# Patient Record
Sex: MALE | Race: WHITE | NOT HISPANIC OR LATINO | Employment: FULL TIME | URBAN - METROPOLITAN AREA
[De-identification: names, ages, dates, MRNs, and addresses within clinical notes are randomized per-mention and may not be internally consistent; named-entity substitution may affect disease eponyms.]

---

## 2020-09-04 NOTE — PROGRESS NOTES
RM:________    Referral Provider: No ref. provider found Mare Romano MD    NEW PATIENT/ CONSULT  PREVIOUS CARDIOLOGIST:   CARDIAC TESTING:     : 1980   AGE: 40 y.o.    2020  REASON FOR VISIT/  CC: HEART FLUTTERS       WT: ____________ BP: __________L __________R/ HR_______________    CHEST PAIN: _____________    SOA: ____________PALPS: __________      LIGHTHEADED: ___________ FATIGUE: _______________ EDEMA______________  ALLERGIES:  Codeine; Erythromycin; and Penicillins  SMOKING HISTORY  Social History     Tobacco Use   • Smoking status: Never Smoker   • Smokeless tobacco: Never Used   Substance Use Topics   • Alcohol use: No   • Drug use: Defer          CHILDREN: _______________________       CAFFEINE USE________  ALCOHOL _____________  OCCUPATION_____________  Past Surgical History:   Procedure Laterality Date   • INNER EAR SURGERY     • TONSILLECTOMY                  FAMILY HISTORY  HEART DISEASE  CHF  DIABETES  CARDIAC ARREST  STROKE  CANCER  ANEURYSM                                                             H/O: MI_____   STROKE________   GOUT_____   ANEMIA______     CAROTID________ HIV____ CAD_______ HYPERCHOL _____    H/O: CHF _____   RF____ DM___ HTN_______PVD____THYROID DISEASE_______    PMH: GI ____   HEPATITIS ___ KIDNEY DISEASE ___ LUNG DISEASE _______     SLEEP APNEA ____ BLOOD CLOTS ____ DVT ____ VEIN STRIPPING ___________     CANCER _________________________________ CHEMO/ RADIATION__________

## 2020-09-09 ENCOUNTER — OFFICE VISIT (OUTPATIENT)
Dept: CARDIOLOGY | Facility: CLINIC | Age: 40
End: 2020-09-09

## 2020-09-09 VITALS
DIASTOLIC BLOOD PRESSURE: 86 MMHG | WEIGHT: 312 LBS | HEIGHT: 72 IN | BODY MASS INDEX: 42.26 KG/M2 | SYSTOLIC BLOOD PRESSURE: 134 MMHG | HEART RATE: 66 BPM

## 2020-09-09 DIAGNOSIS — R00.2 PALPITATIONS: Primary | ICD-10-CM

## 2020-09-09 DIAGNOSIS — Z91.89 FRAMINGHAM CARDIAC RISK <10% IN NEXT 10 YEARS: ICD-10-CM

## 2020-09-09 PROCEDURE — 93000 ELECTROCARDIOGRAM COMPLETE: CPT | Performed by: INTERNAL MEDICINE

## 2020-09-09 PROCEDURE — 99204 OFFICE O/P NEW MOD 45 MIN: CPT | Performed by: INTERNAL MEDICINE

## 2020-09-09 NOTE — PROGRESS NOTES
"Date of Office Visit: 20  Encounter Provider: Julio Tariq MD  Place of Service: [facility] CARDIOLOGY  Patient Name: Elvin Alexis  :1980    Chief Complaint   Patient presents with   • Palpitations   :     HPI:     Mr. Alexis is 40 y.o. and presents today ***      Past Medical History:   Diagnosis Date   • Dyslipidemia    • Fatty liver    • GERD (gastroesophageal reflux disease)    • Morbidly obese (CMS/HCC)        Past Surgical History:   Procedure Laterality Date   • INNER EAR SURGERY     • TONSILLECTOMY         Social History     Socioeconomic History   • Marital status:      Spouse name: Not on file   • Number of children: 1   • Years of education: Not on file   • Highest education level: Not on file   Tobacco Use   • Smoking status: Never Smoker   • Smokeless tobacco: Never Used   Substance and Sexual Activity   • Alcohol use: No   • Drug use: Defer   • Sexual activity: Defer   Social History Narrative    3 children by marriage.        Family History   Problem Relation Age of Onset   • Breast cancer Mother    • Hypertension Father    • Diabetes Father    • Diabetes Other    • Arthritis Other    • Heart attack Other         paternal grandfather, 70s   • COPD Other        ROS    Allergies   Allergen Reactions   • Codeine GI Intolerance     Vomit only   • Erythromycin Unknown (See Comments)     CHILDHOOD REACTION   • Penicillins Rash         Current Outpatient Medications:   •  ibuprofen (ADVIL,MOTRIN) 800 MG tablet, Take 1 tablet by mouth Every 8 (Eight) Hours As Needed for Mild Pain ., Disp: 14 tablet, Rfl: 0      Objective:     Vitals:    20 1411   BP: 134/86   BP Location: Left arm   Pulse: 66   Weight: (!) 142 kg (312 lb)   Height: 182.9 cm (72\")     Body mass index is 42.31 kg/m².    Physical Exam    Procedures      Assessment:       Diagnosis Plan   1. Palpitations  ECG 12 Lead    Adult Transthoracic Echo Complete W/ Cont if Necessary Per Protocol "   2. [place] cardiac risk <10% in next 10 years            Plan:       ***    Sincerely,       Julio Tariq MD

## 2020-09-09 NOTE — PROGRESS NOTES
Date of Office Visit: 20  Encounter Provider: Lupe King MA  Place of Service: [facility] CARDIOLOGY  Patient Name: Elvin Alexis  :1980    No chief complaint on file.  :     HPI:     Mr. Alexis is 40 y.o. and presents today ***      Past Medical History:   Diagnosis Date   • GERD (gastroesophageal reflux disease)    • Hypertriglyceridemia    • Morbidly obese (CMS/HCC)        Past Surgical History:   Procedure Laterality Date   • INNER EAR SURGERY     • TONSILLECTOMY         Social History     Socioeconomic History   • Marital status:      Spouse name: Not on file   • Number of children: Not on file   • Years of education: Not on file   • Highest education level: Not on file   Tobacco Use   • Smoking status: Never Smoker   • Smokeless tobacco: Never Used   Substance and Sexual Activity   • Alcohol use: No   • Drug use: Defer   • Sexual activity: Defer       Family History   Problem Relation Age of Onset   • Breast cancer Mother    • Hypertension Father    • Diabetes Other    • Arthritis Other    • Heart attack Other    • COPD Other        ROS    Allergies   Allergen Reactions   • Codeine GI Intolerance     Vomit only   • Erythromycin Unknown (See Comments)     CHILDHOOD REACTION   • Penicillins Rash         Current Outpatient Medications:   •  ibuprofen (ADVIL,MOTRIN) 800 MG tablet, Take 1 tablet by mouth Every 8 (Eight) Hours As Needed for Mild Pain ., Disp: 14 tablet, Rfl: 0      Objective:     There were no vitals filed for this visit.  There is no height or weight on file to calculate BMI.    Physical Exam    Procedures      Assessment:      No diagnosis found.       Plan:       ***    Sincerely,       Lupe King MA

## 2020-09-09 NOTE — PROGRESS NOTES
Date of Office Visit: 20  Encounter Provider: Julio Tariq MD  Place of Service: [facility] CARDIOLOGY  Patient Name: Elvin Alexis  :1980    Chief Complaint   Patient presents with   • Palpitations   :     HPI:     Mr. Alexis is 40 y.o. and presents today to be evaluated for palpitations.    He is obese.  He has low HDL (30, with , ).  He does not have a diagnosis of hypertension or diabetes.  He has no family history of CAD in first degree relatives.  He snores occasionally but not regularly; he has not been checked for EVER.  He is a non-smoker.    Approximately three weeks ago, he had a few days of palpitations.  They occurred at rest and felt like a small flutter.  He might have one, or have a few over the course of a minute.  They did not cause lightheadedness or chest pain.  He did not have any sustained symptoms or tachycardia.  They resolved on their own after a few days and have not returned.    He is also worried about his ASCVD risk going forward.  He does not have any anginal symptoms.     Past Medical History:   Diagnosis Date   • Dyslipidemia    • Fatty liver    • GERD (gastroesophageal reflux disease)    • Morbidly obese (CMS/HCC)        Past Surgical History:   Procedure Laterality Date   • INNER EAR SURGERY     • TONSILLECTOMY         Social History     Socioeconomic History   • Marital status:      Spouse name: Not on file   • Number of children: 1   • Years of education: Not on file   • Highest education level: Not on file   Tobacco Use   • Smoking status: Never Smoker   • Smokeless tobacco: Never Used   Substance and Sexual Activity   • Alcohol use: No   • Drug use: Defer   • Sexual activity: Defer   Social History Narrative    3 children by marriage.        Family History   Problem Relation Age of Onset   • Breast cancer Mother    • Hypertension Father    • Diabetes Father    • Diabetes Other    • Arthritis Other    • Heart attack  "Other         paternal grandfather, 70s   • COPD Other        Review of Systems   HENT: Positive for nosebleeds.    Cardiovascular: Positive for irregular heartbeat.   All other systems reviewed and are negative.      Allergies   Allergen Reactions   • Codeine GI Intolerance     Vomit only   • Erythromycin Unknown (See Comments)     CHILDHOOD REACTION   • Penicillins Rash         Current Outpatient Medications:   •  ibuprofen (ADVIL,MOTRIN) 800 MG tablet, Take 1 tablet by mouth Every 8 (Eight) Hours As Needed for Mild Pain ., Disp: 14 tablet, Rfl: 0      Objective:     Vitals:    09/09/20 1411   BP: 134/86   BP Location: Left arm   Pulse: 66   Weight: (!) 142 kg (312 lb)   Height: 182.9 cm (72\")     Body mass index is 42.31 kg/m².    Physical Exam   Constitutional: He is oriented to person, place, and time.   Obese   HENT:   Head: Normocephalic.   Nose: Nose normal.   Eyes: Conjunctivae and EOM are normal.   Neck: Normal range of motion. No JVD present.   Cardiovascular: Normal rate, regular rhythm, normal heart sounds and intact distal pulses.   No murmur heard.  Pulmonary/Chest: Effort normal and breath sounds normal.   Abdominal: Soft. There is no tenderness.   Obesity limits abdominal exam   Musculoskeletal: Normal range of motion. He exhibits no edema.   Neurological: He is alert and oriented to person, place, and time. No cranial nerve deficit.   Skin: Skin is warm and dry. No rash noted.   Psychiatric: He has a normal mood and affect. His behavior is normal. Judgment and thought content normal.   Vitals reviewed.        ECG 12 Lead  Date/Time: 9/9/2020 2:20 PM  Performed by: Julio Tariq MD  Authorized by: Julio Tariq MD   Comparison: not compared with previous ECG   Previous ECG: no previous ECG available  Rhythm: sinus rhythm  Conduction: conduction normal  ST Segments: ST segments normal  T Waves: T waves normal  QRS axis: normal  Other: no other findings    Clinical impression: normal ECG            "   Assessment:       Diagnosis Plan   1. Palpitations  ECG 12 Lead    Adult Transthoracic Echo Complete W/ Cont if Necessary Per Protocol   2. [place] cardiac risk <10% in next 10 years            Plan:       His palpitations were surely benign premature ventricular ectopics.  I will get an echo to make sure he has a structurally normal heart, but I don't think he needs rhythm monitoring since they have resolved on their own.  He had labs earlier this year which included normal electrolytes and TSH.      Once the echo is back, we will discuss his ASCVD risk.  His risk is low over the next 10 years, 2%.  Despite it being so low, I do think a CACS would be reasonable, as he has very low HDL and I wonder if he would benefit from therapy for that.  In the meantime, weight loss and cardiovascular exercise are the most important things I would recommend.     Sincerely,       Julio Tariq MD

## 2020-10-20 ENCOUNTER — TELEPHONE (OUTPATIENT)
Dept: CARDIOLOGY | Facility: CLINIC | Age: 40
End: 2020-10-20

## 2020-10-20 ENCOUNTER — HOSPITAL ENCOUNTER (OUTPATIENT)
Dept: CARDIOLOGY | Facility: HOSPITAL | Age: 40
Discharge: HOME | End: 2020-10-20
Admitting: INTERNAL MEDICINE

## 2020-10-20 VITALS
SYSTOLIC BLOOD PRESSURE: 134 MMHG | HEART RATE: 72 BPM | BODY MASS INDEX: 42.26 KG/M2 | DIASTOLIC BLOOD PRESSURE: 86 MMHG | HEIGHT: 72 IN | WEIGHT: 312 LBS

## 2020-10-20 DIAGNOSIS — R00.2 PALPITATIONS: ICD-10-CM

## 2020-10-20 LAB
AORTIC ARCH: 2.6 CM
ASCENDING AORTA: 3.3 CM
BH CV ECHO MEAS - ACS: 2.6 CM
BH CV ECHO MEAS - AO MAX PG (FULL): 4.3 MMHG
BH CV ECHO MEAS - AO MAX PG: 8.5 MMHG
BH CV ECHO MEAS - AO MEAN PG (FULL): 2.6 MMHG
BH CV ECHO MEAS - AO MEAN PG: 4.5 MMHG
BH CV ECHO MEAS - AO V2 MAX: 146 CM/SEC
BH CV ECHO MEAS - AO V2 MEAN: 98.7 CM/SEC
BH CV ECHO MEAS - AO V2 VTI: 30.6 CM
BH CV ECHO MEAS - ASC AORTA: 3.3 CM
BH CV ECHO MEAS - AVA(I,A): 3.5 CM^2
BH CV ECHO MEAS - AVA(I,D): 3.5 CM^2
BH CV ECHO MEAS - AVA(V,A): 2.9 CM^2
BH CV ECHO MEAS - AVA(V,D): 2.9 CM^2
BH CV ECHO MEAS - BSA(HAYCOCK): 2.7 M^2
BH CV ECHO MEAS - BSA: 2.6 M^2
BH CV ECHO MEAS - BZI_BMI: 42.3 KILOGRAMS/M^2
BH CV ECHO MEAS - BZI_METRIC_HEIGHT: 182.9 CM
BH CV ECHO MEAS - BZI_METRIC_WEIGHT: 141.5 KG
BH CV ECHO MEAS - EDV(MOD-SP2): 78 ML
BH CV ECHO MEAS - EDV(MOD-SP4): 81 ML
BH CV ECHO MEAS - EDV(TEICH): 120.6 ML
BH CV ECHO MEAS - EF(CUBED): 74 %
BH CV ECHO MEAS - EF(MOD-BP): 56.5 %
BH CV ECHO MEAS - EF(MOD-SP2): 57.7 %
BH CV ECHO MEAS - EF(MOD-SP4): 56.8 %
BH CV ECHO MEAS - EF(TEICH): 65.5 %
BH CV ECHO MEAS - ESV(MOD-SP2): 33 ML
BH CV ECHO MEAS - ESV(MOD-SP4): 35 ML
BH CV ECHO MEAS - ESV(TEICH): 41.6 ML
BH CV ECHO MEAS - FS: 36.1 %
BH CV ECHO MEAS - IVS/LVPW: 1
BH CV ECHO MEAS - IVSD: 1.1 CM
BH CV ECHO MEAS - LAT PEAK E' VEL: 11.9 CM/SEC
BH CV ECHO MEAS - LV DIASTOLIC VOL/BSA (35-75): 31.5 ML/M^2
BH CV ECHO MEAS - LV MASS(C)D: 204.3 GRAMS
BH CV ECHO MEAS - LV MASS(C)DI: 79.4 GRAMS/M^2
BH CV ECHO MEAS - LV MAX PG: 4.2 MMHG
BH CV ECHO MEAS - LV MEAN PG: 1.9 MMHG
BH CV ECHO MEAS - LV SYSTOLIC VOL/BSA (12-30): 13.6 ML/M^2
BH CV ECHO MEAS - LV V1 MAX: 102.3 CM/SEC
BH CV ECHO MEAS - LV V1 MEAN: 63.1 CM/SEC
BH CV ECHO MEAS - LV V1 VTI: 25.3 CM
BH CV ECHO MEAS - LVIDD: 5 CM
BH CV ECHO MEAS - LVIDS: 3.2 CM
BH CV ECHO MEAS - LVLD AP2: 7.8 CM
BH CV ECHO MEAS - LVLD AP4: 7.3 CM
BH CV ECHO MEAS - LVLS AP2: 6.8 CM
BH CV ECHO MEAS - LVLS AP4: 6.3 CM
BH CV ECHO MEAS - LVOT AREA (M): 4.2 CM^2
BH CV ECHO MEAS - LVOT AREA: 4.2 CM^2
BH CV ECHO MEAS - LVOT DIAM: 2.3 CM
BH CV ECHO MEAS - LVPWD: 1.1 CM
BH CV ECHO MEAS - MED PEAK E' VEL: 10.1 CM/SEC
BH CV ECHO MEAS - MR MAX PG: 82.2 MMHG
BH CV ECHO MEAS - MR MAX VEL: 453.4 CM/SEC
BH CV ECHO MEAS - MV A DUR: 0.13 SEC
BH CV ECHO MEAS - MV A MAX VEL: 60.8 CM/SEC
BH CV ECHO MEAS - MV DEC SLOPE: 355.8 CM/SEC^2
BH CV ECHO MEAS - MV DEC TIME: 0.23 SEC
BH CV ECHO MEAS - MV E MAX VEL: 81 CM/SEC
BH CV ECHO MEAS - MV E/A: 1.3
BH CV ECHO MEAS - MV MAX PG: 5.5 MMHG
BH CV ECHO MEAS - MV MEAN PG: 2.2 MMHG
BH CV ECHO MEAS - MV P1/2T MAX VEL: 81.8 CM/SEC
BH CV ECHO MEAS - MV P1/2T: 67.4 MSEC
BH CV ECHO MEAS - MV V2 MAX: 116.7 CM/SEC
BH CV ECHO MEAS - MV V2 MEAN: 71.1 CM/SEC
BH CV ECHO MEAS - MV V2 VTI: 34.8 CM
BH CV ECHO MEAS - MVA P1/2T LCG: 2.7 CM^2
BH CV ECHO MEAS - MVA(P1/2T): 3.3 CM^2
BH CV ECHO MEAS - MVA(VTI): 3.1 CM^2
BH CV ECHO MEAS - PA MAX PG (FULL): 2.4 MMHG
BH CV ECHO MEAS - PA MAX PG: 4.5 MMHG
BH CV ECHO MEAS - PA V2 MAX: 105.9 CM/SEC
BH CV ECHO MEAS - PULM A REVS DUR: 0.12 SEC
BH CV ECHO MEAS - PULM A REVS VEL: 31.5 CM/SEC
BH CV ECHO MEAS - PULM DIAS VEL: 39.9 CM/SEC
BH CV ECHO MEAS - PULM S/D: 1.5
BH CV ECHO MEAS - PULM SYS VEL: 61.2 CM/SEC
BH CV ECHO MEAS - PVA(V,A): 3.7 CM^2
BH CV ECHO MEAS - PVA(V,D): 3.7 CM^2
BH CV ECHO MEAS - QP/QS: 0.95
BH CV ECHO MEAS - RAP SYSTOLE: 3 MMHG
BH CV ECHO MEAS - RV MAX PG: 2.1 MMHG
BH CV ECHO MEAS - RV MEAN PG: 1.2 MMHG
BH CV ECHO MEAS - RV V1 MAX: 72.4 CM/SEC
BH CV ECHO MEAS - RV V1 MEAN: 53.1 CM/SEC
BH CV ECHO MEAS - RV V1 VTI: 18.7 CM
BH CV ECHO MEAS - RVOT AREA: 5.4 CM^2
BH CV ECHO MEAS - RVOT DIAM: 2.6 CM
BH CV ECHO MEAS - RVSP: 22 MMHG
BH CV ECHO MEAS - SI(CUBED): 36.8 ML/M^2
BH CV ECHO MEAS - SI(LVOT): 41.4 ML/M^2
BH CV ECHO MEAS - SI(MOD-SP2): 17.5 ML/M^2
BH CV ECHO MEAS - SI(MOD-SP4): 17.9 ML/M^2
BH CV ECHO MEAS - SI(TEICH): 30.7 ML/M^2
BH CV ECHO MEAS - SUP REN AO DIAM: 2 CM
BH CV ECHO MEAS - SV(CUBED): 94.8 ML
BH CV ECHO MEAS - SV(LVOT): 106.4 ML
BH CV ECHO MEAS - SV(MOD-SP2): 45 ML
BH CV ECHO MEAS - SV(MOD-SP4): 46 ML
BH CV ECHO MEAS - SV(RVOT): 101 ML
BH CV ECHO MEAS - SV(TEICH): 79 ML
BH CV ECHO MEAS - TAPSE (>1.6): 2.6 CM
BH CV ECHO MEAS - TR MAX VEL: 224.4 CM/SEC
BH CV ECHO MEASUREMENTS AVERAGE E/E' RATIO: 7.36
BH CV XLRA - RV BASE: 3.6 CM
BH CV XLRA - RV LENGTH: 7.2 CM
BH CV XLRA - RV MID: 2.9 CM
BH CV XLRA - TDI S': 11 CM/SEC
LEFT ATRIUM VOLUME INDEX: 28 ML/M2
MAXIMAL PREDICTED HEART RATE: 180 BPM
SINUS: 3.5 CM
STJ: 2.9 CM
STRESS TARGET HR: 153 BPM

## 2020-10-20 PROCEDURE — 93306 TTE W/DOPPLER COMPLETE: CPT | Performed by: INTERNAL MEDICINE

## 2020-10-20 PROCEDURE — 93306 TTE W/DOPPLER COMPLETE: CPT

## 2020-10-20 NOTE — TELEPHONE ENCOUNTER
----- Message from Julio Tariq MD sent at 10/20/2020  1:43 PM EDT -----  I left a VM, I feel this study is normal.  I don't think there's much LVH, and likely normal for his size.  I asked him to call me back about the CACS we discussed.

## 2023-01-09 ENCOUNTER — OFFICE VISIT (OUTPATIENT)
Dept: CARDIOLOGY | Facility: CLINIC | Age: 43
End: 2023-01-09
Payer: COMMERCIAL

## 2023-01-09 VITALS
DIASTOLIC BLOOD PRESSURE: 80 MMHG | BODY MASS INDEX: 42.66 KG/M2 | HEART RATE: 62 BPM | WEIGHT: 315 LBS | SYSTOLIC BLOOD PRESSURE: 130 MMHG | HEIGHT: 72 IN

## 2023-01-09 DIAGNOSIS — R06.83 SNORING: ICD-10-CM

## 2023-01-09 DIAGNOSIS — I49.8 FLUTTERING HEART: Primary | ICD-10-CM

## 2023-01-09 DIAGNOSIS — M62.838 MUSCLE SPASM: ICD-10-CM

## 2023-01-09 PROCEDURE — 93000 ELECTROCARDIOGRAM COMPLETE: CPT | Performed by: NURSE PRACTITIONER

## 2023-01-09 PROCEDURE — 99214 OFFICE O/P EST MOD 30 MIN: CPT | Performed by: NURSE PRACTITIONER

## 2023-01-09 NOTE — PROGRESS NOTES
Date of Office Visit: 2023  Encounter Provider: BRENT Carrillo  Place of Service: [facility] CARDIOLOGY  Patient Name: Elvin Alexis  :1980  Primary Cardiologist: Dr. Julio Tariq    Chief Complaint   Patient presents with   • Palpitations   :     HPI: Elvin Alexis is a 42 y.o. male who presents today for cardiac follow-up visit. He is a new patient to me and I reviewed his medical records.    He has been diagnosed with GERD, dyslipidemia, fatty liver, and obesity.  He denies a history of hypertension, diabetes, or cardiac disease.    In 2020, he saw Dr. Tariq for a few days of palpitations which he described a brief fluttering sensation and he was also worried about his ASCVD risk going forward.  She reviewed his previous blood work that year which showed normal electrolytes and TSH.  Echocardiogram showed normal LVEF, concentric left ventricular hypertrophy (Dr. Tariq said she did not appreciate much LVH and felt that it was normal for size), and mild tricuspid regurgitation.  She recommended a coronary artery calcium score.    He presents today for evaluation of a fluttering sensation and increased heart rate.  He has had intermittent palpitations over the past couple of years.  He describes a brief fluttering sensation in his epigastric area which has become more frequent over the past 2 weeks.  He notices that if he climbs steps or is carrying in groceries that he gets his fluttering sensation in the epigastric area that just last a few seconds and resolves with rest.  He describes it as a \"muscle spasm\".  He says he has been under an increased amount of stress.  He drinks 2 cups of caffeine (1 coffee and 1 Diet Coke) daily, denies use of nicotine/illicit drug/stimulants, and denies history of thyroid disease.  He snores at nighttime, but has never been tested for sleep apnea.  He is seeing his PCP on Thursday and will have blood work completed that  day      Past Medical History:   Diagnosis Date   • Dyslipidemia    • Fatty liver    • GERD (gastroesophageal reflux disease)    • Morbidly obese (HCC)        Past Surgical History:   Procedure Laterality Date   • INNER EAR SURGERY     • TONSILLECTOMY         Social History     Socioeconomic History   • Marital status:    • Number of children: 1   Tobacco Use   • Smoking status: Never   • Smokeless tobacco: Never   Substance and Sexual Activity   • Alcohol use: No   • Drug use: Defer   • Sexual activity: Defer       Family History   Problem Relation Age of Onset   • Breast cancer Mother    • Hypertension Father    • Diabetes Father    • Diabetes Other    • Arthritis Other    • Heart attack Other         paternal grandfather, 70s   • COPD Other        The following portion of the patient's history were reviewed and updated as appropriate: past medical history, past surgical history, past social history, past family history, allergies, current medications, and problem list.    Review of Systems   Constitutional: Negative.   Cardiovascular: Positive for palpitations.   Respiratory: Negative.    Hematologic/Lymphatic: Negative.    Musculoskeletal: Positive for muscle cramps.   Neurological: Negative.        Allergies   Allergen Reactions   • Codeine GI Intolerance     Vomit only   • Erythromycin Unknown (See Comments)     CHILDHOOD REACTION   • Phenylephrine Other (See Comments)     Chest pain   • Penicillins Rash         Current Outpatient Medications:   •  ibuprofen (ADVIL,MOTRIN) 800 MG tablet, Take 1 tablet by mouth Every 8 (Eight) Hours As Needed for Mild Pain . (Patient taking differently: Take 800 mg by mouth As Needed for Mild Pain.), Disp: 14 tablet, Rfl: 0         Objective:     Vitals:    01/09/23 1100   BP: 130/80   BP Location: Left arm   Patient Position: Sitting   Cuff Size: Adult   Pulse: 62   Weight: (!) 144 kg (317 lb 9.6 oz)   Height: 182.9 cm (72\")     Body mass index is 43.07  kg/m².    PHYSICAL EXAM:    Vitals Reviewed.   General Appearance: No acute distress, well developed and well nourished. Obese.   Eyes: Conjunctiva and lids: No erythema, swelling, or discharge. Sclera non-icteric.    HENT: Atraumatic, normocephalic. External eyes, ears, and nose normal. No hearing loss noted. Mucous membranes normal. Lips not cyanotic. Neck supple with no tenderness. Wearing mask.   Respiratory: No signs of respiratory distress. Respiration rhythm and depth normal.   Clear to auscultation. No rales, crackles, rhonchi, or wheezing auscultated.   Cardiovascular:  Jugular Venous Pressure: Normal  Heart Rate and Rhythm: Normal, Heart Sounds: Normal S1 and S2. No S3 or S4 noted.  Murmurs: No murmurs noted. No rubs, thrills, or gallops.   Lower Extremities: No edema noted.  Gastrointestinal:  Abdomen soft, non-distended, non-tender.    Musculoskeletal: Normal movement of extremities.  Skin and Nails: General appearance normal. No pallor, cyanosis, diaphoresis. Skin temperature normal. No clubbing of fingernails.   Psychiatric: Patient alert and oriented to person, place, and time. Speech and behavior appropriate. Normal mood and affect.       ECG 12 Lead    Date/Time: 1/9/2023 11:00 AM  Performed by: Erika Hess APRN  Authorized by: Erika Hess APRN   Comparison: compared with previous ECG from 9/9/2020  Similar to previous ECG  Rhythm: sinus rhythm  Rate: normal  BPM: 82  Conduction: conduction normal  ST Segments: ST segments normal  T Waves: T waves normal  QRS axis: normal  Other: no other findings    Clinical impression: normal ECG              Assessment:       Diagnosis Plan   1. Fluttering heart  Holter Monitor - 24 Hour    Ambulatory Referral to Sleep Medicine      2. Muscle spasm  Holter Monitor - 24 Hour      3. Snoring  Holter Monitor - 24 Hour    Ambulatory Referral to Sleep Medicine             Plan:       He reports a fluttering sensation over the past couple of years it is  worsened over the last 2 weeks.  He has noticed this when going up steps or carrying groceries.  He had it the other day and his apple watch captured normal sinus rhythm with a heart rate of 89 bpm.  He feels that it may be attributed to stress.      He also describes as fluttering sensation in the epigastric area not the upper chest.  He said sometimes it feels like a \"muscle spasm\".  I wonder if this is not actually an palpitation, but some type of esophageal spasm.  I asked him to discuss with his PCP on Thursday.    In the meantime, I recommended 24-hour Holter monitor.  He snores at nighttime and have referred him to [facility] sleep medicine.  He will have blood work completed at his PCP office on Thursday and hopefully that will include electrolytes and thyroid testing.  If he continues to have the symptoms and there is nothing on the Holter monitor, we may put him on the treadmill to see what his response to exercises with his blood pressure/heart rate/EKG.  At this time he does not want to proceed with a CT calcium score as previously recommended 2 years ago.    Further recommendations to follow after testing is completed.    ADDENDUM 1/16/2023:  · Blood work completed at PCP office on 1/12/23: CBC, CMP, TSH normal.     As always, it has been a pleasure to participate in your patient's care. Thank you.         Sincerely,         BRENT Zaragoza  [facility]      · Dictated utilizing Dragon Dictation  · COVID-19 Precautions - Patient was compliant in wearing a mask. When I saw the patient, I used appropriate personal protective equipment (PPE) including mask and eye shield (standard procedure).  Additionally, I used gown and gloves if indicated.  Hand hygiene was completed before and after seeing the patient.  · I spent 30 minutes reviewing her medical records/testing/previous office notes/labs, face-to-face interaction with patient, physical examination, formulating the plan  of care, and discussion of plan of care with patient.

## 2023-01-19 ENCOUNTER — TELEPHONE (OUTPATIENT)
Dept: CARDIOLOGY | Facility: CLINIC | Age: 43
End: 2023-01-19
Payer: COMMERCIAL

## 2023-01-19 PROBLEM — I49.3 PVCS (PREMATURE VENTRICULAR CONTRACTIONS): Status: ACTIVE | Noted: 2023-01-19

## 2023-01-19 NOTE — TELEPHONE ENCOUNTER
Please schedule 6-month follow-up visit with Dr. Tariq to follow-up on PVCs/palpitations.  Thank you.

## 2023-01-19 NOTE — TELEPHONE ENCOUNTER
Mr. Alexis seen 1/9 for fluttering sensation and increased heart rate.  Holter monitor shows average heart rate of 70.  Palpitations correlated with PVCs. Labs at PCP stable. He thinks stress is the big contributor & is working with his PCP. He does not want to start a beta blocker. He will monitor and call with concerns. He wants to follow up with Dr. Tariq in 6 months.

## 2023-02-17 RX ORDER — ATENOLOL 25 MG/1
25 TABLET ORAL DAILY
Qty: 30 TABLET | Refills: 0 | Status: SHIPPED | OUTPATIENT
Start: 2023-02-17 | End: 2023-02-21

## 2023-02-21 RX ORDER — ATENOLOL 25 MG/1
25 TABLET ORAL DAILY
Qty: 90 TABLET | Refills: 3 | Status: SHIPPED | OUTPATIENT
Start: 2023-02-21

## 2023-03-06 ENCOUNTER — APPOINTMENT (OUTPATIENT)
Dept: CT IMAGING | Facility: HOSPITAL | Age: 43
End: 2023-03-06
Payer: COMMERCIAL

## 2023-03-06 ENCOUNTER — HOSPITAL ENCOUNTER (OUTPATIENT)
Facility: HOSPITAL | Age: 43
Setting detail: OBSERVATION
Discharge: HOME | End: 2023-03-07
Attending: EMERGENCY MEDICINE | Admitting: EMERGENCY MEDICINE
Payer: COMMERCIAL

## 2023-03-06 DIAGNOSIS — M25.532 LEFT WRIST PAIN: ICD-10-CM

## 2023-03-06 DIAGNOSIS — R51.9 ACUTE NONINTRACTABLE HEADACHE, UNSPECIFIED HEADACHE TYPE: Primary | ICD-10-CM

## 2023-03-06 DIAGNOSIS — M79.642 PAIN IN LEFT HAND: ICD-10-CM

## 2023-03-06 DIAGNOSIS — M54.81 OCCIPITAL NEURALGIA, UNSPECIFIED LATERALITY: ICD-10-CM

## 2023-03-06 LAB
ALBUMIN SERPL-MCNC: 4.2 G/DL (ref 3.5–5.2)
ALBUMIN/GLOB SERPL: 1.6 G/DL
ALP SERPL-CCNC: 82 U/L (ref 39–117)
ALT SERPL W P-5'-P-CCNC: 19 U/L (ref 1–41)
ANION GAP SERPL CALCULATED.3IONS-SCNC: 10.9 MMOL/L (ref 5–15)
AST SERPL-CCNC: 13 U/L (ref 1–40)
BASOPHILS # BLD AUTO: 0.03 10*3/MM3 (ref 0–0.2)
BASOPHILS NFR BLD AUTO: 0.3 % (ref 0–1.5)
BILIRUB SERPL-MCNC: 0.6 MG/DL (ref 0–1.2)
BUN SERPL-MCNC: 12 MG/DL (ref 6–20)
BUN/CREAT SERPL: 15.4 (ref 7–25)
CALCIUM SPEC-SCNC: 8.9 MG/DL (ref 8.6–10.5)
CHLORIDE SERPL-SCNC: 106 MMOL/L (ref 98–107)
CO2 SERPL-SCNC: 24.1 MMOL/L (ref 22–29)
CREAT SERPL-MCNC: 0.78 MG/DL (ref 0.76–1.27)
DEPRECATED RDW RBC AUTO: 41.1 FL (ref 37–54)
EGFRCR SERPLBLD CKD-EPI 2021: 114.2 ML/MIN/1.73
EOSINOPHIL # BLD AUTO: 0.34 10*3/MM3 (ref 0–0.4)
EOSINOPHIL NFR BLD AUTO: 3.8 % (ref 0.3–6.2)
ERYTHROCYTE [DISTWIDTH] IN BLOOD BY AUTOMATED COUNT: 12.6 % (ref 12.3–15.4)
GLOBULIN UR ELPH-MCNC: 2.7 GM/DL
GLUCOSE SERPL-MCNC: 103 MG/DL (ref 65–99)
HCT VFR BLD AUTO: 39.8 % (ref 37.5–51)
HGB BLD-MCNC: 14.1 G/DL (ref 13–17.7)
IMM GRANULOCYTES # BLD AUTO: 0.02 10*3/MM3 (ref 0–0.05)
IMM GRANULOCYTES NFR BLD AUTO: 0.2 % (ref 0–0.5)
LYMPHOCYTES # BLD AUTO: 1.72 10*3/MM3 (ref 0.7–3.1)
LYMPHOCYTES NFR BLD AUTO: 19.3 % (ref 19.6–45.3)
MCH RBC QN AUTO: 31.1 PG (ref 26.6–33)
MCHC RBC AUTO-ENTMCNC: 35.4 G/DL (ref 31.5–35.7)
MCV RBC AUTO: 87.9 FL (ref 79–97)
MONOCYTES # BLD AUTO: 1.16 10*3/MM3 (ref 0.1–0.9)
MONOCYTES NFR BLD AUTO: 13 % (ref 5–12)
NEUTROPHILS NFR BLD AUTO: 5.63 10*3/MM3 (ref 1.7–7)
NEUTROPHILS NFR BLD AUTO: 63.4 % (ref 42.7–76)
NRBC BLD AUTO-RTO: 0 /100 WBC (ref 0–0.2)
PLATELET # BLD AUTO: 207 10*3/MM3 (ref 140–450)
PMV BLD AUTO: 11.1 FL (ref 6–12)
POTASSIUM SERPL-SCNC: 4 MMOL/L (ref 3.5–5.2)
PROT SERPL-MCNC: 6.9 G/DL (ref 6–8.5)
RBC # BLD AUTO: 4.53 10*6/MM3 (ref 4.14–5.8)
SODIUM SERPL-SCNC: 141 MMOL/L (ref 136–145)
WBC NRBC COR # BLD: 8.9 10*3/MM3 (ref 3.4–10.8)

## 2023-03-06 PROCEDURE — G0378 HOSPITAL OBSERVATION PER HR: HCPCS

## 2023-03-06 PROCEDURE — 36415 COLL VENOUS BLD VENIPUNCTURE: CPT

## 2023-03-06 PROCEDURE — 85025 COMPLETE CBC W/AUTO DIFF WBC: CPT | Performed by: PHYSICIAN ASSISTANT

## 2023-03-06 PROCEDURE — 96374 THER/PROPH/DIAG INJ IV PUSH: CPT

## 2023-03-06 PROCEDURE — 99284 EMERGENCY DEPT VISIT MOD MDM: CPT

## 2023-03-06 PROCEDURE — 25010000002 METHYLPREDNISOLONE PER 125 MG: Performed by: PHYSICIAN ASSISTANT

## 2023-03-06 PROCEDURE — 70450 CT HEAD/BRAIN W/O DYE: CPT

## 2023-03-06 PROCEDURE — 80053 COMPREHEN METABOLIC PANEL: CPT | Performed by: PHYSICIAN ASSISTANT

## 2023-03-06 RX ORDER — ACETAMINOPHEN 325 MG/1
650 TABLET ORAL EVERY 4 HOURS PRN
Status: DISCONTINUED | OUTPATIENT
Start: 2023-03-06 | End: 2023-03-07 | Stop reason: HOSPADM

## 2023-03-06 RX ORDER — OXCARBAZEPINE 300 MG/1
300 TABLET, FILM COATED ORAL 2 TIMES DAILY
Status: DISCONTINUED | OUTPATIENT
Start: 2023-03-06 | End: 2023-03-07 | Stop reason: HOSPADM

## 2023-03-06 RX ORDER — ONDANSETRON 2 MG/ML
4 INJECTION INTRAMUSCULAR; INTRAVENOUS EVERY 6 HOURS PRN
Status: DISCONTINUED | OUTPATIENT
Start: 2023-03-06 | End: 2023-03-07 | Stop reason: HOSPADM

## 2023-03-06 RX ORDER — SODIUM CHLORIDE 0.9 % (FLUSH) 0.9 %
10 SYRINGE (ML) INJECTION AS NEEDED
Status: DISCONTINUED | OUTPATIENT
Start: 2023-03-06 | End: 2023-03-07 | Stop reason: HOSPADM

## 2023-03-06 RX ORDER — ESCITALOPRAM OXALATE 10 MG/1
10 TABLET ORAL DAILY
COMMUNITY

## 2023-03-06 RX ORDER — NITROGLYCERIN 0.4 MG/1
0.4 TABLET SUBLINGUAL
Status: DISCONTINUED | OUTPATIENT
Start: 2023-03-06 | End: 2023-03-07 | Stop reason: HOSPADM

## 2023-03-06 RX ORDER — SODIUM CHLORIDE 9 MG/ML
40 INJECTION, SOLUTION INTRAVENOUS AS NEEDED
Status: DISCONTINUED | OUTPATIENT
Start: 2023-03-06 | End: 2023-03-07 | Stop reason: HOSPADM

## 2023-03-06 RX ORDER — SODIUM CHLORIDE 0.9 % (FLUSH) 0.9 %
10 SYRINGE (ML) INJECTION EVERY 12 HOURS SCHEDULED
Status: DISCONTINUED | OUTPATIENT
Start: 2023-03-06 | End: 2023-03-07 | Stop reason: HOSPADM

## 2023-03-06 RX ORDER — CYCLOBENZAPRINE HCL 10 MG
10 TABLET ORAL 3 TIMES DAILY PRN
COMMUNITY

## 2023-03-06 RX ORDER — ONDANSETRON 4 MG/1
4 TABLET, FILM COATED ORAL EVERY 6 HOURS PRN
Status: DISCONTINUED | OUTPATIENT
Start: 2023-03-06 | End: 2023-03-07 | Stop reason: HOSPADM

## 2023-03-06 RX ORDER — METHYLPREDNISOLONE SODIUM SUCCINATE 125 MG/2ML
80 INJECTION, POWDER, LYOPHILIZED, FOR SOLUTION INTRAMUSCULAR; INTRAVENOUS ONCE
Status: COMPLETED | OUTPATIENT
Start: 2023-03-06 | End: 2023-03-06

## 2023-03-06 RX ADMIN — Medication 10 ML: at 20:58

## 2023-03-06 RX ADMIN — OXCARBAZEPINE 300 MG: 300 TABLET, FILM COATED ORAL at 23:47

## 2023-03-06 RX ADMIN — METHYLPREDNISOLONE SODIUM SUCCINATE 80 MG: 125 INJECTION, POWDER, FOR SOLUTION INTRAMUSCULAR; INTRAVENOUS at 19:40

## 2023-03-06 NOTE — ED TRIAGE NOTES
Patient to [facility] via car from home for pulsing headache  Patient was seen for same recently    Patient wearing mask this RN in PPE

## 2023-03-06 NOTE — ED PROVIDER NOTES
EMERGENCY DEPARTMENT ENCOUNTER    Room Number:  107/1  Date seen:  3/7/2023  PCP: Mare Romano MD  Discussed/ obtained information from independent historians: patient      HPI:  Chief Complaint: headache  A complete HPI/ROS/PMH/PSH/SH/FH are unobtainable due to: none  Context: Elvin Alexis is a 42 y.o. male who presents to the ED c/o pain in his head that started 2 days ago.  It is intermittent, lasts a few seconds at a time and severe when present.  He states it originates towards the back of his scalp and pain is centered mostly in that area.  Its mostly on the right but occasionally happens on the left as well.  He denies any nausea vomiting vision changes photophobia or phonophobia, numbness weakness or other symptoms or complaints.  He was seen at another ER the night that symptoms began and incidentally had a fever.  He had negative respiratory and strep testing and he was prescribed a muscle relaxer.  He states his symptoms were a little better yesterday, it happened less frequently but escalated again today which prompted repeat evaluation.  He was recently diagnosed with depression and placed on an antidepressant as well as PVCs causing palpitations and was prescribed a beta-blocker.      External (non-ED) record review: Patient evaluated on 3/4/2023 at [facility] for headache that started the day prior.  COVID and RSV test and influenza test were negative, strep screen negative.  Diagnosed with nonspecific syndrome suggestive of viral illness, muscle spasm and headache and prescribed cyclobenzaprine and ibuprofen 800.      PAST MEDICAL HISTORY  Active Ambulatory Problems     Diagnosis Date Noted   • Fluttering heart 01/09/2023   • Muscle spasm 01/09/2023   • Snoring 01/09/2023   • PVCs (premature ventricular contractions) 01/19/2023     Resolved Ambulatory Problems     Diagnosis Date Noted   • No Resolved Ambulatory Problems     Past Medical History:   Diagnosis Date   •  Dyslipidemia    • Fatty liver    • GERD (gastroesophageal reflux disease)    • Morbidly obese (HCC)          PAST SURGICAL HISTORY  Past Surgical History:   Procedure Laterality Date   • INNER EAR SURGERY     • TONSILLECTOMY           FAMILY HISTORY  Family History   Problem Relation Age of Onset   • Breast cancer Mother    • Hypertension Father    • Diabetes Father    • Diabetes Other    • Arthritis Other    • Heart attack Other         paternal grandfather, 70s   • COPD Other          SOCIAL HISTORY  Social History     Socioeconomic History   • Marital status:    • Number of children: 1   Tobacco Use   • Smoking status: Never   • Smokeless tobacco: Never   Vaping Use   • Vaping Use: Never used   Substance and Sexual Activity   • Alcohol use: No   • Drug use: Defer   • Sexual activity: Defer         ALLERGIES  Codeine, Erythromycin, Phenylephrine, and Penicillins        REVIEW OF SYSTEMS  Review of Systems         PHYSICAL EXAM  ED Triage Vitals   Temp Heart Rate Resp BP SpO2   03/06/23 1545 03/06/23 1545 03/06/23 1545 03/06/23 1550 03/06/23 1545   97.7 °F (36.5 °C) 87 16 163/99 97 %      Temp src Heart Rate Source Patient Position BP Location FiO2 (%)   03/06/23 1545 03/06/23 1545 -- -- --   Tympanic Monitor          Physical Exam      GENERAL: Well-appearing, no acute distress  HENT: normocephalic, atraumatic, TMs normal, oropharynx clear and moist  EYES: no scleral icterus, PERRL, extract movements intact  CV: regular rhythm, normal rate  RESPIRATORY: normal effort CTA B  ABDOMEN: nondistended  MUSCULOSKELETAL: no deformity  NEURO: Neuro: Alert and oriented x3, extraocular motion intact, pupils are equal and round reactive to light, cranial nerves II through XII are grossly intact, normal speech, moves all extremities well, 5 out of 5 strength all 4 extremities, sensation intact light touch all 4 extremities, no ataxia.  PSYCH:  calm, cooperative  SKIN: warm, dry    Vital signs and nursing notes  reviewed.          LAB RESULTS  Recent Results (from the past 24 hour(s))   Comprehensive Metabolic Panel    Collection Time: 03/06/23  7:36 PM    Specimen: Blood   Result Value Ref Range    Glucose 103 (H) 65 - 99 mg/dL    BUN 12 6 - 20 mg/dL    Creatinine 0.78 0.76 - 1.27 mg/dL    Sodium 141 136 - 145 mmol/L    Potassium 4.0 3.5 - 5.2 mmol/L    Chloride 106 98 - 107 mmol/L    CO2 24.1 22.0 - 29.0 mmol/L    Calcium 8.9 8.6 - 10.5 mg/dL    Total Protein 6.9 6.0 - 8.5 g/dL    Albumin 4.2 3.5 - 5.2 g/dL    ALT (SGPT) 19 1 - 41 U/L    AST (SGOT) 13 1 - 40 U/L    Alkaline Phosphatase 82 39 - 117 U/L    Total Bilirubin 0.6 0.0 - 1.2 mg/dL    Globulin 2.7 gm/dL    A/G Ratio 1.6 g/dL    BUN/Creatinine Ratio 15.4 7.0 - 25.0    Anion Gap 10.9 5.0 - 15.0 mmol/L    eGFR 114.2 >60.0 mL/min/1.73   CBC Auto Differential    Collection Time: 03/06/23  7:36 PM    Specimen: Blood   Result Value Ref Range    WBC 8.90 3.40 - 10.80 10*3/mm3    RBC 4.53 4.14 - 5.80 10*6/mm3    Hemoglobin 14.1 13.0 - 17.7 g/dL    Hematocrit 39.8 37.5 - 51.0 %    MCV 87.9 79.0 - 97.0 fL    MCH 31.1 26.6 - 33.0 pg    MCHC 35.4 31.5 - 35.7 g/dL    RDW 12.6 12.3 - 15.4 %    RDW-SD 41.1 37.0 - 54.0 fl    MPV 11.1 6.0 - 12.0 fL    Platelets 207 140 - 450 10*3/mm3    Neutrophil % 63.4 42.7 - 76.0 %    Lymphocyte % 19.3 (L) 19.6 - 45.3 %    Monocyte % 13.0 (H) 5.0 - 12.0 %    Eosinophil % 3.8 0.3 - 6.2 %    Basophil % 0.3 0.0 - 1.5 %    Immature Grans % 0.2 0.0 - 0.5 %    Neutrophils, Absolute 5.63 1.70 - 7.00 10*3/mm3    Lymphocytes, Absolute 1.72 0.70 - 3.10 10*3/mm3    Monocytes, Absolute 1.16 (H) 0.10 - 0.90 10*3/mm3    Eosinophils, Absolute 0.34 0.00 - 0.40 10*3/mm3    Basophils, Absolute 0.03 0.00 - 0.20 10*3/mm3    Immature Grans, Absolute 0.02 0.00 - 0.05 10*3/mm3    nRBC 0.0 0.0 - 0.2 /100 WBC       Ordered the above labs and reviewed the results.        RADIOLOGY  CT Head Without Contrast    Result Date: 3/6/2023  EMERGENCY NONCONTRAST HEAD CT ON  03/06/2023  CLINICAL HISTORY: Headache  TECHNIQUE: Spiral CT images were obtained from the base of the skull to the vertex without intravenous contrast. The images were reformatted and submitted in 3 mm thick axial, sagittal and coronal CT sections with brain algorithm  COMPARISON: There are no prior head CTs from [facility] for comparison.  FINDINGS: The brain parenchyma is normal in attenuation. The ventricles are normal in size. I see no focal mass effect. There is no midline shift. No extra axial fluid collections are identified. There is no evidence of acute intracranial hemorrhage. The calvarium and skull base are normal in appearance. There is fluid partially opacifying the posterior half of the left maxillary sinus. The remainder of the paranasal sinuses, mastoid air cells and middle ear cavities are clear.      1. No acute intracranial abnormality is identified. There is fluid partially opacifying the posterior half of the left maxillary sinus. The remainder of the head CT is within normal limits. The results were communicated to Hubert Robbins by telephone 03/06/2023 at 6:20 PM.  Radiation dose reduction techniques were utilized, including automated exposure control and exposure modulation based on body size.  This report was finalized on 3/6/2023 11:25 PM by Dr. Victor Hugo Vee M.D.        Ordered the above noted radiological studies. Reviewed by me in PACS.            PROCEDURES  Procedures              MEDICATIONS GIVEN IN ER  Medications   sodium chloride 0.9 % flush 10 mL (10 mL Intravenous Given 3/6/23 2058)   sodium chloride 0.9 % flush 10 mL (has no administration in time range)   sodium chloride 0.9 % infusion 40 mL (has no administration in time range)   acetaminophen (TYLENOL) tablet 650 mg (has no administration in time range)   ondansetron (ZOFRAN) tablet 4 mg (has no administration in time range)     Or   ondansetron (ZOFRAN) injection 4 mg (has no administration in time range)    nitroglycerin (NITROSTAT) SL tablet 0.4 mg (has no administration in time range)   OXcarbazepine (TRILEPTAL) tablet 300 mg (300 mg Oral Given 3/6/23 2347)   atenolol (TENORMIN) tablet 25 mg (has no administration in time range)   cyclobenzaprine (FLEXERIL) tablet 10 mg (has no administration in time range)   escitalopram (LEXAPRO) tablet 10 mg (has no administration in time range)   ibuprofen (ADVIL,MOTRIN) tablet 800 mg (has no administration in time range)   methylPREDNISolone sodium succinate (SOLU-Medrol) injection 80 mg (80 mg Intravenous Given 3/6/23 1940)                   MEDICAL DECISION MAKING, PROGRESS, and CONSULTS    All labs have been independently reviewed by me.  All radiology studies have been reviewed by me and I have also reviewed the radiology report.   EKG's independently viewed and interpreted by me.  Discussion below represents my analysis of pertinent findings related to patient's condition, differential diagnosis, treatment plan and final disposition.      Additional sources:      - Shared decision making: Offered the patient admission for further evaluation and treatment and he is agreeable      Orders placed during this visit:  Orders Placed This Encounter   Procedures   • CT Head Without Contrast   • CT Angiogram Head   • CT Angiogram Neck   • Comprehensive Metabolic Panel   • CBC Auto Differential   • Potassium   • Magnesium   • High Sensitivity Troponin T   • Blood Gas, Arterial -   • Basic Metabolic Panel   • CBC (No Diff)   • Diet: Regular/House Diet; Texture: Regular Texture (IDDSI 7); Fluid Consistency: Thin (IDDSI 0)   • Vital Signs   • Activity - Ad Marika   • Advance Diet as Tolerated   • Intake & Output   • Neuro Checks   • Telemetry - Maintain IV Access   • Continuous Cardiac Monitoring   • May Be Off Telemetry for Tests   • Pulse Oximetry, Continuous   • Code Status and Medical Interventions:   • Inpatient Neurology Consult General   • Inpatient Neurology Consult General   •  Oxygen Therapy- Nasal Cannula; Titrate for SPO2: 90% - 95%   • Oxygen Therapy- Nasal Cannula; Titrate for SPO2: 90% - 95%   • ECG 12 Lead Chest Pain   • Insert Peripheral IV   • Initiate ED Observation Status   • Fall Precautions   • CBC & Differential         Differential diagnosis:  Differential diagnosis for headache includes but is not limited to:  - subarachnoid hemorrhage  - intracranial mass  - stroke  - RCVS  - meningitis  - glaucoma  - giant cell arteritis  - CO poisoning  - cerebral venous sinus thrombosis  - migraine        Independent interpretation of labs, radiology studies, and discussions with consultants:  ED Course as of 03/07/23 0134   Mon Mar 06, 2023   1915 I discussed the patient with Dr. Le, neurologist.  He recommends admission to the ED observation unit.  We like the patient to have Solu-Medrol 80 mg as well as Trileptal 300 mg tonight at bedtime and twice daily going forward.  Recommend CTA of the head neck if his renal functions okay and he will consult [KA]   1925 I reassessed the patient, updated him about my conversation with Dr. Le and he is agreeable with the recommendations [KA]   1946 I discussed the patient with BRENT Dumont for the ED observation unit.  We discussed patient's history presentation labs and imaging and she agrees to admit on behalf of Dr. Acevedo. [KA]   2023 Glucose(!): 103 [KA]   2023 Creatinine: 0.78 [KA]   2023 WBC: 8.90 [KA]   2023 Hemoglobin: 14.1 [KA]      ED Course User Index  [KA] Galina Asencio PA             Patient was wearing a face mask when I entered the room and they continued to wear a mask throughout their stay in the ED.  I wore PPE, including  gloves, face mask with shield or face mask with goggles whenever I was in the room with patient.     DIAGNOSIS  Final diagnoses:   Acute nonintractable headache, unspecified headache type         Latest Documented Vital Signs:  As of 01:34 EST  BP- 144/83 HR- 77 Temp- 98.6 °F (37 °C) (Oral)  O2 sat- 97%              --    Please note that portions of this were completed with a voice recognition program.       Note Disclaimer: At [facility], we believe that sharing information builds trust and better relationships. You are receiving this note because you are receiving care at [facility] or recently visited. It is possible you will see health information before a provider has talked with you about it. This kind of information can be easy to misunderstand. To help you fully understand what it means for your health, we urge you to discuss this note with your provider.           Galina Asencio PA  03/07/23 0134

## 2023-03-07 ENCOUNTER — TELEPHONE (OUTPATIENT)
Dept: NEUROLOGY | Facility: CLINIC | Age: 43
End: 2023-03-07

## 2023-03-07 ENCOUNTER — APPOINTMENT (OUTPATIENT)
Dept: CT IMAGING | Facility: HOSPITAL | Age: 43
End: 2023-03-07
Payer: COMMERCIAL

## 2023-03-07 VITALS
DIASTOLIC BLOOD PRESSURE: 93 MMHG | TEMPERATURE: 97.9 F | HEIGHT: 72 IN | BODY MASS INDEX: 42.66 KG/M2 | RESPIRATION RATE: 16 BRPM | SYSTOLIC BLOOD PRESSURE: 158 MMHG | WEIGHT: 315 LBS | OXYGEN SATURATION: 98 % | HEART RATE: 96 BPM

## 2023-03-07 LAB
ANION GAP SERPL CALCULATED.3IONS-SCNC: 9 MMOL/L (ref 5–15)
BUN SERPL-MCNC: 16 MG/DL (ref 6–20)
BUN/CREAT SERPL: 19.3 (ref 7–25)
CALCIUM SPEC-SCNC: 9.1 MG/DL (ref 8.6–10.5)
CHLORIDE SERPL-SCNC: 108 MMOL/L (ref 98–107)
CO2 SERPL-SCNC: 24 MMOL/L (ref 22–29)
CREAT SERPL-MCNC: 0.83 MG/DL (ref 0.76–1.27)
DEPRECATED RDW RBC AUTO: 41.4 FL (ref 37–54)
EGFRCR SERPLBLD CKD-EPI 2021: 112.1 ML/MIN/1.73
ERYTHROCYTE [DISTWIDTH] IN BLOOD BY AUTOMATED COUNT: 12.6 % (ref 12.3–15.4)
GLUCOSE SERPL-MCNC: 201 MG/DL (ref 65–99)
HCT VFR BLD AUTO: 42.8 % (ref 37.5–51)
HGB BLD-MCNC: 14.4 G/DL (ref 13–17.7)
MCH RBC QN AUTO: 30.4 PG (ref 26.6–33)
MCHC RBC AUTO-ENTMCNC: 33.6 G/DL (ref 31.5–35.7)
MCV RBC AUTO: 90.3 FL (ref 79–97)
PLATELET # BLD AUTO: 222 10*3/MM3 (ref 140–450)
PMV BLD AUTO: 11.5 FL (ref 6–12)
POTASSIUM SERPL-SCNC: 4.5 MMOL/L (ref 3.5–5.2)
RBC # BLD AUTO: 4.74 10*6/MM3 (ref 4.14–5.8)
SODIUM SERPL-SCNC: 141 MMOL/L (ref 136–145)
WBC NRBC COR # BLD: 9 10*3/MM3 (ref 3.4–10.8)

## 2023-03-07 PROCEDURE — G0378 HOSPITAL OBSERVATION PER HR: HCPCS

## 2023-03-07 PROCEDURE — 70496 CT ANGIOGRAPHY HEAD: CPT

## 2023-03-07 PROCEDURE — 85027 COMPLETE CBC AUTOMATED: CPT

## 2023-03-07 PROCEDURE — 80048 BASIC METABOLIC PNL TOTAL CA: CPT

## 2023-03-07 PROCEDURE — 99253 IP/OBS CNSLTJ NEW/EST LOW 45: CPT | Performed by: PSYCHIATRY & NEUROLOGY

## 2023-03-07 PROCEDURE — 70498 CT ANGIOGRAPHY NECK: CPT

## 2023-03-07 PROCEDURE — 25510000001 IOPAMIDOL PER 1 ML: Performed by: EMERGENCY MEDICINE

## 2023-03-07 RX ORDER — OXCARBAZEPINE 300 MG/1
300 TABLET, FILM COATED ORAL 2 TIMES DAILY
Qty: 60 TABLET | Refills: 0 | Status: SHIPPED | OUTPATIENT
Start: 2023-03-07

## 2023-03-07 RX ORDER — ESCITALOPRAM OXALATE 10 MG/1
10 TABLET ORAL DAILY
Status: DISCONTINUED | OUTPATIENT
Start: 2023-03-07 | End: 2023-03-07 | Stop reason: HOSPADM

## 2023-03-07 RX ORDER — IBUPROFEN 800 MG/1
800 TABLET ORAL EVERY 8 HOURS PRN
Status: DISCONTINUED | OUTPATIENT
Start: 2023-03-07 | End: 2023-03-07 | Stop reason: HOSPADM

## 2023-03-07 RX ORDER — ATENOLOL 25 MG/1
25 TABLET ORAL DAILY
Status: DISCONTINUED | OUTPATIENT
Start: 2023-03-07 | End: 2023-03-07 | Stop reason: HOSPADM

## 2023-03-07 RX ORDER — IBUPROFEN 800 MG/1
800 TABLET ORAL AS NEEDED
Start: 2023-03-07

## 2023-03-07 RX ORDER — CYCLOBENZAPRINE HCL 10 MG
10 TABLET ORAL 3 TIMES DAILY PRN
Status: DISCONTINUED | OUTPATIENT
Start: 2023-03-07 | End: 2023-03-07 | Stop reason: HOSPADM

## 2023-03-07 RX ADMIN — Medication 10 ML: at 08:14

## 2023-03-07 RX ADMIN — OXCARBAZEPINE 300 MG: 300 TABLET, FILM COATED ORAL at 08:14

## 2023-03-07 RX ADMIN — ATENOLOL 25 MG: 25 TABLET ORAL at 08:14

## 2023-03-07 RX ADMIN — ESCITALOPRAM 10 MG: 10 TABLET, FILM COATED ORAL at 08:14

## 2023-03-07 RX ADMIN — IOPAMIDOL 95 ML: 755 INJECTION, SOLUTION INTRAVENOUS at 07:48

## 2023-03-07 RX ADMIN — ACETAMINOPHEN 650 MG: 325 TABLET, FILM COATED ORAL at 08:14

## 2023-03-07 NOTE — PLAN OF CARE
Problem: Adult Inpatient Plan of Care  Goal: Plan of Care Review  Outcome: Ongoing, Progressing  Flowsheets (Taken 3/7/2023 0635)  Progress: improving  Plan of Care Reviewed With: patient  Outcome Evaluation: Completed admission orders. Pt is alert and oriented, room air and ambulate to the bathroom independently. Administered ordered medication. Pt had no complaints. Family at bedside. Continuing plan of care.  Goal: Patient-Specific Goal (Individualized)  Outcome: Ongoing, Progressing  Goal: Absence of Hospital-Acquired Illness or Injury  Outcome: Ongoing, Progressing  Intervention: Identify and Manage Fall Risk  Recent Flowsheet Documentation  Taken 3/7/2023 0600 by Minoo Bernstein RN  Safety Promotion/Fall Prevention:   clutter free environment maintained   fall prevention program maintained   lighting adjusted   nonskid shoes/slippers when out of bed   safety round/check completed   room organization consistent  Taken 3/7/2023 0200 by Minoo Bernstein, FARRAH  Safety Promotion/Fall Prevention:   clutter free environment maintained   fall prevention program maintained   lighting adjusted   nonskid shoes/slippers when out of bed   room organization consistent   safety round/check completed  Taken 3/7/2023 0000 by Minoo Bernstein RN  Safety Promotion/Fall Prevention:   clutter free environment maintained   fall prevention program maintained   lighting adjusted   nonskid shoes/slippers when out of bed   room organization consistent   safety round/check completed  Taken 3/6/2023 2200 by Minoo Bernstein RN  Safety Promotion/Fall Prevention:   activity supervised   assistive device/personal items within reach   fall prevention program maintained   lighting adjusted   nonskid shoes/slippers when out of bed   room organization consistent   safety round/check completed  Taken 3/6/2023 2058 by Minoo Bernstein, RN  Safety Promotion/Fall Prevention:   clutter free environment maintained   fall prevention program maintained   lighting  adjusted   nonskid shoes/slippers when out of bed   safety round/check completed   room organization consistent  Intervention: Prevent Skin Injury  Recent Flowsheet Documentation  Taken 3/7/2023 0600 by Minoo Bernstein RN  Body Position: position changed independently  Taken 3/7/2023 0200 by Minoo Bernstein RN  Body Position: position changed independently  Taken 3/7/2023 0000 by Minoo Bernstein RN  Body Position: position changed independently  Skin Protection: adhesive use limited  Taken 3/6/2023 2200 by Minoo Bernstein RN  Body Position: position changed independently  Taken 3/6/2023 2058 by Minoo Bernstein RN  Body Position: position changed independently  Skin Protection: adhesive use limited  Intervention: Prevent and Manage VTE (Venous Thromboembolism) Risk  Recent Flowsheet Documentation  Taken 3/7/2023 0600 by Minoo Bernstein RN  Activity Management:   activity encouraged   activity adjusted per tolerance  Taken 3/7/2023 0200 by Minoo Bernstein RN  Activity Management:   activity encouraged   activity adjusted per tolerance  Taken 3/7/2023 0000 by Minoo Bernstein RN  Activity Management:   activity adjusted per tolerance   activity encouraged  Taken 3/6/2023 2200 by Minoo Bernstein RN  Activity Management:   activity adjusted per tolerance   activity encouraged  Taken 3/6/2023 2058 by Minoo Bernstein RN  Activity Management:   activity adjusted per tolerance   activity encouraged  Intervention: Prevent Infection  Recent Flowsheet Documentation  Taken 3/7/2023 0600 by Minoo Bernstein RN  Infection Prevention:   hand hygiene promoted   rest/sleep promoted   single patient room provided  Taken 3/7/2023 0200 by Minoo Bernstein RN  Infection Prevention:   hand hygiene promoted   rest/sleep promoted   single patient room provided  Taken 3/7/2023 0000 by Minoo Bernstein RN  Infection Prevention:   hand hygiene promoted   rest/sleep promoted   single patient room provided  Taken 3/6/2023 2200 by Minoo Bernstein RN  Infection  Prevention:   hand hygiene promoted   rest/sleep promoted   single patient room provided  Taken 3/6/2023 2058 by Minoo Bernstein, RN  Infection Prevention:   hand hygiene promoted   rest/sleep promoted   single patient room provided  Goal: Optimal Comfort and Wellbeing  Outcome: Ongoing, Progressing  Goal: Readiness for Transition of Care  Outcome: Ongoing, Progressing  Intervention: Mutually Develop Transition Plan  Recent Flowsheet Documentation  Taken 3/6/2023 2057 by Minoo Bernstein, RN  Transportation Anticipated: family or friend will provide  Patient/Family Anticipated Services at Transition: none  Patient/Family Anticipates Transition to: home  Taken 3/6/2023 2055 by Minoo Bernstein, RN  Equipment Currently Used at Home: none  Goal: Plan of Care Review  Outcome: Ongoing, Progressing  Flowsheets (Taken 3/7/2023 0635)  Progress: improving  Plan of Care Reviewed With: patient  Outcome Evaluation: Completed admission orders. Pt is alert and oriented, room air and ambulate to the bathroom independently. Administered ordered medication. Pt had no complaints. Family at bedside. Continuing plan of care.  Goal: Patient-Specific Goal (Individualized)  Outcome: Ongoing, Progressing  Goal: Absence of Hospital-Acquired Illness or Injury  Outcome: Ongoing, Progressing  Intervention: Identify and Manage Fall Risk  Recent Flowsheet Documentation  Taken 3/7/2023 0600 by Minoo Bernstein RN  Safety Promotion/Fall Prevention:   clutter free environment maintained   fall prevention program maintained   lighting adjusted   nonskid shoes/slippers when out of bed   safety round/check completed   room organization consistent  Taken 3/7/2023 0200 by Minoo Bernstein, FARRAH  Safety Promotion/Fall Prevention:   clutter free environment maintained   fall prevention program maintained   lighting adjusted   nonskid shoes/slippers when out of bed   room organization consistent   safety round/check completed  Taken 3/7/2023 0000 by Minoo Bernstein,  RN  Safety Promotion/Fall Prevention:   clutter free environment maintained   fall prevention program maintained   lighting adjusted   nonskid shoes/slippers when out of bed   room organization consistent   safety round/check completed  Taken 3/6/2023 2200 by Minoo Bernstein RN  Safety Promotion/Fall Prevention:   activity supervised   assistive device/personal items within reach   fall prevention program maintained   lighting adjusted   nonskid shoes/slippers when out of bed   room organization consistent   safety round/check completed  Taken 3/6/2023 2058 by Minoo Bernstein RN  Safety Promotion/Fall Prevention:   clutter free environment maintained   fall prevention program maintained   lighting adjusted   nonskid shoes/slippers when out of bed   safety round/check completed   room organization consistent  Intervention: Prevent Skin Injury  Recent Flowsheet Documentation  Taken 3/7/2023 0600 by Minoo Bernstein RN  Body Position: position changed independently  Taken 3/7/2023 0200 by Minoo Bernstein RN  Body Position: position changed independently  Taken 3/7/2023 0000 by Minoo Bernstein RN  Body Position: position changed independently  Skin Protection: adhesive use limited  Taken 3/6/2023 2200 by Minoo Bernstein RN  Body Position: position changed independently  Taken 3/6/2023 2058 by Minoo Bernstein RN  Body Position: position changed independently  Skin Protection: adhesive use limited  Intervention: Prevent and Manage VTE (Venous Thromboembolism) Risk  Recent Flowsheet Documentation  Taken 3/7/2023 0600 by Minoo Bernstein RN  Activity Management:   activity encouraged   activity adjusted per tolerance  Taken 3/7/2023 0200 by Minoo Bernstein RN  Activity Management:   activity encouraged   activity adjusted per tolerance  Taken 3/7/2023 0000 by Minoo Bernstein RN  Activity Management:   activity adjusted per tolerance   activity encouraged  Taken 3/6/2023 2200 by Minoo Bernstein RN  Activity Management:   activity  adjusted per tolerance   activity encouraged  Taken 3/6/2023 2058 by Minoo Bernstein RN  Activity Management:   activity adjusted per tolerance   activity encouraged  Intervention: Prevent Infection  Recent Flowsheet Documentation  Taken 3/7/2023 0600 by Minoo Bernstein RN  Infection Prevention:   hand hygiene promoted   rest/sleep promoted   single patient room provided  Taken 3/7/2023 0200 by Minoo Bernstein RN  Infection Prevention:   hand hygiene promoted   rest/sleep promoted   single patient room provided  Taken 3/7/2023 0000 by Minoo Bernstein RN  Infection Prevention:   hand hygiene promoted   rest/sleep promoted   single patient room provided  Taken 3/6/2023 2200 by Minoo Bernstein RN  Infection Prevention:   hand hygiene promoted   rest/sleep promoted   single patient room provided  Taken 3/6/2023 2058 by Minoo Bernstein RN  Infection Prevention:   hand hygiene promoted   rest/sleep promoted   single patient room provided  Goal: Optimal Comfort and Wellbeing  Outcome: Ongoing, Progressing  Goal: Readiness for Transition of Care  Outcome: Ongoing, Progressing  Intervention: Mutually Develop Transition Plan  Recent Flowsheet Documentation  Taken 3/6/2023 2057 by Minoo Bernstein RN  Transportation Anticipated: family or friend will provide  Patient/Family Anticipated Services at Transition: none  Patient/Family Anticipates Transition to: home  Taken 3/6/2023 2055 by Minoo Bernstein RN  Equipment Currently Used at Home: none   Goal Outcome Evaluation:  Plan of Care Reviewed With: patient        Progress: improving  Outcome Evaluation: Completed admission orders. Pt is alert and oriented, room air and ambulate to the bathroom independently. Administered ordered medication. Pt had no complaints. Family at bedside. Continuing plan of care.

## 2023-03-07 NOTE — CONSULTS
Patient Identification:  NAME:  Elvin Alexis  Age:  42 y.o.   Sex:  male   :  1980   MRN:  [id number]       Chief complaint: Sharp shooting pains in the back of my head, reason for consult shooting pains in the back of the head    History of present illness: Patient is a 42-year-old right-handed white male with a history of GERD, obesity, dyslipidemia who comes to the hospital with about a 3-day history.  Duration of sharp shooting pains in the back of the head.  The location is either in the right or left occipital region and shoots up over a second up to the crown of the head.  It is very painful but is like a sharp jolt.  Modifying factors last night he was given Solu-Medrol and started on Trileptal 300 mg p.o. twice daily and he is dramatically better overnight.  Location is noted quality as described duration is noted modifying factors as noted.  He is much better today.  I ordered a CTA of the head and neck and there is no evidence of dissection or aneurysm formation.  He does have some anatomical variants but they are absolutely within normal limits.      Past medical history:  Past Medical History:   Diagnosis Date   • Dyslipidemia    • Fatty liver    • GERD (gastroesophageal reflux disease)    • Morbidly obese (HCC)    • PVCs (premature ventricular contractions) 2023       Past surgical history:  Past Surgical History:   Procedure Laterality Date   • INNER EAR SURGERY     • TONSILLECTOMY         Allergies:  Codeine, Erythromycin, Phenylephrine, and Penicillins    Home medications:  Medications Prior to Admission   Medication Sig Dispense Refill Last Dose   • atenolol (TENORMIN) 25 MG tablet TAKE 1 TABLET BY MOUTH DAILY 90 tablet 3 Past Week   • cyclobenzaprine (FLEXERIL) 10 MG tablet Take 1 tablet by mouth 3 (Three) Times a Day As Needed for Muscle Spasms.   3/6/2023 at 1400   • escitalopram (LEXAPRO) 10 MG tablet Take 1 tablet by mouth Daily.   3/6/2023 at 0830   • ibuprofen (ADVIL,MOTRIN)  800 MG tablet Take 1 tablet by mouth Every 8 (Eight) Hours As Needed for Mild Pain . (Patient taking differently: Take 1 tablet by mouth As Needed for Mild Pain.) 14 tablet 0 3/6/2023 at 1400        Hospital medications:  atenolol, 25 mg, Oral, Daily  escitalopram, 10 mg, Oral, Daily  OXcarbazepine, 300 mg, Oral, BID  sodium chloride, 10 mL, Intravenous, Q12H         •  acetaminophen  •  cyclobenzaprine  •  ibuprofen  •  nitroglycerin  •  ondansetron **OR** ondansetron  •  sodium chloride  •  sodium chloride    Family history:  Family History   Problem Relation Age of Onset   • Breast cancer Mother    • Hypertension Father    • Diabetes Father    • Diabetes Other    • Arthritis Other    • Heart attack Other         paternal grandfather, 70s   • COPD Other        Social history:  Social History     Tobacco Use   • Smoking status: Never   • Smokeless tobacco: Never   Vaping Use   • Vaping Use: Never used   Substance Use Topics   • Alcohol use: No   • Drug use: Defer       Review of systems:    Shooting pain in the back of the head right side or left side or to the crown of the head.  No further anterior at all.  No dull headache or persistent headache but just no sharp shooting pains.  No hair eyes ears nose throat skin bone joint  lymphatic hematologic oncologic complaints no chest pain abdominal pain bowel bladder incontinence fever chills or rash    Objective:  Vitals Ranges:   Temp:  [97.7 °F (36.5 °C)-99.1 °F (37.3 °C)] 97.9 °F (36.6 °C)  Heart Rate:  [77-96] 96  Resp:  [16-18] 16  BP: (135-163)/(83-99) 158/93      Physical Exam:  Awake alert oriented x3 in no distress fund of knowledge good attention span concentration good recent remote memory good language function normal well-developed well-nourished in no distress pupils 3 constricting to 2-1/2 unable to visualize disc retinas extraocular movements full without nystagmus nasolabial folds palate and tongue symmetrical normal hearing facial sensation head  turning shoulder shrug.  No temporal region tenderness or palpable temporal arteries.  No real tenderness in the bilateral occipital regions either to palpation.  Motor 5 out of 5 x 4 extremities no atrophy fasciculations rigidity bradykinesia resting tremor reflexes trace throughout symmetrical toes downgoing bilaterally normal light touch face both arms both legs coordination normal in the upper extremities Station and gait he is able to ambulate without ataxia heart is regular without murmur neck supple without bruits extremities no clubbing cyanosis or edema visual acuity normal on either side centrally and peripherally bilaterally    Results review:   I reviewed the patient's new clinical results.    Data review:  Lab Results (last 24 hours)     Procedure Component Value Units Date/Time    Basic Metabolic Panel [[id number]]  (Abnormal) Collected: 03/07/23 0426    Specimen: Blood from Arm, Right Updated: 03/07/23 0609     Glucose 201 mg/dL      BUN 16 mg/dL      Creatinine 0.83 mg/dL      Sodium 141 mmol/L      Potassium 4.5 mmol/L      Comment: Slight hemolysis detected by analyzer. Results may be affected.        Chloride 108 mmol/L      CO2 24.0 mmol/L      Calcium 9.1 mg/dL      BUN/Creatinine Ratio 19.3     Anion Gap 9.0 mmol/L      eGFR 112.1 mL/min/1.73     Narrative:      GFR Normal >60  Chronic Kidney Disease <60  Kidney Failure <15      CBC (No Diff) [[id number]]  (Normal) Collected: 03/07/23 0426    Specimen: Blood from Arm, Right Updated: 03/07/23 0522     WBC 9.00 10*3/mm3      RBC 4.74 10*6/mm3      Hemoglobin 14.4 g/dL      Hematocrit 42.8 %      MCV 90.3 fL      MCH 30.4 pg      MCHC 33.6 g/dL      RDW 12.6 %      RDW-SD 41.4 fl      MPV 11.5 fL      Platelets 222 10*3/mm3     Comprehensive Metabolic Panel [[id number]]  (Abnormal) Collected: 03/06/23 1936    Specimen: Blood Updated: 03/06/23 2006     Glucose 103 mg/dL      BUN 12 mg/dL      Creatinine 0.78 mg/dL      Sodium 141 mmol/L      Potassium  4.0 mmol/L      Chloride 106 mmol/L      CO2 24.1 mmol/L      Calcium 8.9 mg/dL      Total Protein 6.9 g/dL      Albumin 4.2 g/dL      ALT (SGPT) 19 U/L      AST (SGOT) 13 U/L      Alkaline Phosphatase 82 U/L      Total Bilirubin 0.6 mg/dL      Globulin 2.7 gm/dL      A/G Ratio 1.6 g/dL      BUN/Creatinine Ratio 15.4     Anion Gap 10.9 mmol/L      eGFR 114.2 mL/min/1.73     Narrative:      GFR Normal >60  Chronic Kidney Disease <60  Kidney Failure <15      CBC & Differential [[id number]]  (Abnormal) Collected: 03/06/23 1936    Specimen: Blood Updated: 03/06/23 1947    Narrative:      The following orders were created for panel order CBC & Differential.  Procedure                               Abnormality         Status                     ---------                               -----------         ------                     CBC Auto Differential[[id number]]         Abnormal            Final result                 Please view results for these tests on the individual orders.    CBC Auto Differential [[id number]]  (Abnormal) Collected: 03/06/23 1936    Specimen: Blood Updated: 03/06/23 1947     WBC 8.90 10*3/mm3      RBC 4.53 10*6/mm3      Hemoglobin 14.1 g/dL      Hematocrit 39.8 %      MCV 87.9 fL      MCH 31.1 pg      MCHC 35.4 g/dL      RDW 12.6 %      RDW-SD 41.1 fl      MPV 11.1 fL      Platelets 207 10*3/mm3      Neutrophil % 63.4 %      Lymphocyte % 19.3 %      Monocyte % 13.0 %      Eosinophil % 3.8 %      Basophil % 0.3 %      Immature Grans % 0.2 %      Neutrophils, Absolute 5.63 10*3/mm3      Lymphocytes, Absolute 1.72 10*3/mm3      Monocytes, Absolute 1.16 10*3/mm3      Eosinophils, Absolute 0.34 10*3/mm3      Basophils, Absolute 0.03 10*3/mm3      Immature Grans, Absolute 0.02 10*3/mm3      nRBC 0.0 /100 WBC            Imaging:  Imaging Results (Last 24 Hours)     Procedure Component Value Units Date/Time    CT Angiogram Head [[id number]] Collected: 03/07/23 0849     Updated: 03/07/23 0850    Narrative:      CT  ANGIOGRAM OF THE NECK AND HEAD WITH CONTRAST     HISTORY: Headache.     COMPARISON: CT head 03/06/2023.     Initially, a noncontrasted CT examination of brain was performed. The  brain ventricles are symmetrical. There is no evidence of hemorrhage,  acute infarction, hydrocephalus or of abnormal extra-axial fluid.     A small scalp hematoma is appreciated overlying the parietal bones at  the vertex. A scalp nodule is appreciated likely representing a  sebaceous cyst in the frontoparietal region on the right at the vertex  measuring 13 mm in size. Fluid is present within the left maxillary  sinus. A CT angiogram of the neck and head was then performed.  Multiplanar as well as 3-dimensional reconstructions were generated.     FINDINGS: The great vessels are arranged in a classic configuration.  There is 0% stenosis of the internal carotid arteries proximally using  NASCET criteria. Venous contamination limits evaluation of the cavernous  ICAs. The right A1 segment is hypoplastic. The proximal aspects of the  anterior and middle cerebral arteries appear unremarkable.     Both vertebral arteries were opacified. The vertebral arteries are  codominant. The cervical segments are of relatively uniform caliber. The  basilar artery is somewhat diminutive in caliber. The proximal aspect of  the right posterior cerebral artery appears unremarkable. There is a  fetal origin left posterior cerebral artery. The posterior communicating  artery on the right is markedly hypoplastic or atretic.       Impression:      There is no evidence of acute infarction or hemorrhage.  There is 0% stenosis of the carotid bifurcations. The vertebral arteries  are codominant. Evaluation of the intracranial vasculature was limited  by venous contamination. The posterior circulation is relatively  isolated. There is a fetal origin of the left posterior cerebral artery  on the right. Posterior communicating artery is markedly hypoplastic or  atretic.  Further evaluation could be performed with a MRI examination of  the brain.     NOTE: This is a preliminary report. The three-dimensional  reconstructions are pending.           Radiation dose reduction techniques were utilized, including automated  exposure control and exposure modulation based on body size.          CT Angiogram Neck [[id number]] Collected: 03/07/23 0849     Updated: 03/07/23 0850    Narrative:      CT ANGIOGRAM OF THE NECK AND HEAD WITH CONTRAST     HISTORY: Headache.     COMPARISON: CT head 03/06/2023.     Initially, a noncontrasted CT examination of brain was performed. The  brain ventricles are symmetrical. There is no evidence of hemorrhage,  acute infarction, hydrocephalus or of abnormal extra-axial fluid.     A small scalp hematoma is appreciated overlying the parietal bones at  the vertex. A scalp nodule is appreciated likely representing a  sebaceous cyst in the frontoparietal region on the right at the vertex  measuring 13 mm in size. Fluid is present within the left maxillary  sinus. A CT angiogram of the neck and head was then performed.  Multiplanar as well as 3-dimensional reconstructions were generated.     FINDINGS: The great vessels are arranged in a classic configuration.  There is 0% stenosis of the internal carotid arteries proximally using  NASCET criteria. Venous contamination limits evaluation of the cavernous  ICAs. The right A1 segment is hypoplastic. The proximal aspects of the  anterior and middle cerebral arteries appear unremarkable.     Both vertebral arteries were opacified. The vertebral arteries are  codominant. The cervical segments are of relatively uniform caliber. The  basilar artery is somewhat diminutive in caliber. The proximal aspect of  the right posterior cerebral artery appears unremarkable. There is a  fetal origin left posterior cerebral artery. The posterior communicating  artery on the right is markedly hypoplastic or atretic.       Impression:       There is no evidence of acute infarction or hemorrhage.  There is 0% stenosis of the carotid bifurcations. The vertebral arteries  are codominant. Evaluation of the intracranial vasculature was limited  by venous contamination. The posterior circulation is relatively  isolated. There is a fetal origin of the left posterior cerebral artery  on the right. Posterior communicating artery is markedly hypoplastic or  atretic. Further evaluation could be performed with a MRI examination of  the brain.     NOTE: This is a preliminary report. The three-dimensional  reconstructions are pending.           Radiation dose reduction techniques were utilized, including automated  exposure control and exposure modulation based on body size.          CT Head Without Contrast [[id number]] Collected: 03/06/23 1859     Updated: 03/06/23 2324    Narrative:      EMERGENCY NONCONTRAST HEAD CT ON 03/06/2023     CLINICAL HISTORY: Headache     TECHNIQUE: Spiral CT images were obtained from the base of the skull to  the vertex without intravenous contrast. The images were reformatted and  submitted in 3 mm thick axial, sagittal and coronal CT sections with  brain algorithm     COMPARISON: There are no prior head CTs from [facility] for comparison.     FINDINGS: The brain parenchyma is normal in attenuation. The ventricles  are normal in size. I see no focal mass effect. There is no midline  shift. No extra axial fluid collections are identified. There is no  evidence of acute intracranial hemorrhage. The calvarium and skull base  are normal in appearance. There is fluid partially opacifying the  posterior half of the left maxillary sinus. The remainder of the  paranasal sinuses, mastoid air cells and middle ear cavities are clear.       Impression:      1. No acute intracranial abnormality is identified. There is fluid  partially opacifying the posterior half of the left maxillary sinus. The  remainder of the head CT is  within normal limits. The results were  communicated to Hubert Robbins by telephone 03/06/2023 at 6:20 PM.      Radiation dose reduction techniques were utilized, including automated  exposure control and exposure modulation based on body size.     This report was finalized on 3/6/2023 11:25 PM by Dr. Victor Hugo Vee M.D.         PPE worn at all times washed before washed up afterwards disposed of everything properly is not within 6 feet of them for more than few minutes during my exam no aerosols used at any point    Assessment and Plan:     This patient has classic occipital neuralgia.  He has had it for about 3 days and it is a recurrent sharp shooting pain that overnight has been dramatically improved after Solu-Medrol and since he has been started on Trileptal 300 mg p.o. twice daily  The CTA of the head and neck shows some normal anatomical variance but specifically no evidence of an aneurysm and no evidence of dissection.  He is doing much better today and would like to go home and I am okay with that.  I have asked him to follow-up with his primary MD in the next week or 2 to get his blood checked and make sure that his sodium is not low secondary to the Trileptal.  It is not clear how long he needs to be on the Trileptal but I think it is definitely working.  Thanks      Yakov Le MD  03/07/23  09:55 EST

## 2023-03-07 NOTE — PROGRESS NOTES
"MD ATTESTATION NOTE    The YESSI and I have discussed this patient's history, physical exam, and treatment plan.  I have reviewed the documentation and personally had a face to face interaction with the patient. I affirm the documentation and agree with the treatment and plan.  The attached note describes my personal findings.      I provided a substantive portion of the care of the patient.  I personally performed the physical exam in its entirety, and below are my findings.  For this patient encounter, the patient wore surgical mask, I wore full protective PPE including N95 and eye protection.      Brief HPI: Patient is still having intermittent pain in his head.  Pain is described as a \"jolt\".  Pain has improved after Solu-Medrol.    PHYSICAL EXAM  ED Triage Vitals   Temp Heart Rate Resp BP SpO2   03/06/23 1545 03/06/23 1545 03/06/23 1545 03/06/23 1550 03/06/23 1545   97.7 °F (36.5 °C) 87 16 163/99 97 %      Temp src Heart Rate Source Patient Position BP Location FiO2 (%)   03/06/23 1545 03/06/23 1545 03/06/23 2039 03/06/23 2039 --   Tympanic Monitor Lying Right arm          GENERAL: Awake, alert, oriented x3.  Well-developed, well-nourished male.  Resting comfortably in no acute distress  HENT: nares patent  EYES: EOMI  CV: regular rhythm, normal rate  RESPIRATORY: normal effort, clear to auscultation bilaterally  ABDOMEN: soft, nontender  MUSCULOSKELETAL: no deformity  NEURO: Speech is clear and fluent.  No facial droop.  Follows commands.  PSYCH:  calm, cooperative  SKIN: warm, dry    Vital signs and nursing notes reviewed.        Plan: Neurology consultation is pending.  CTA head/neck were performed this morning and results are still pending.    "

## 2023-03-07 NOTE — PROGRESS NOTES
ED OBSERVATION PROGRESS/DISCHARGE SUMMARY    Date of Admission: 3/6/2023   LOS: 0 days   PCP: Mare Romano MD    Subjective  Patient feeling significantly improved this morning after beginning Trileptal last night.    Hospital Outcome: 42-year-old male admitted to the observation unit for further evaluation of headache.  Patient has CTA head and neck which are negative acute, no evidence of an aneurysm and no evidence of dissection..  Patient was seen by neurology, discussed with Dr. Le.  Patient will be discharged home on Trileptal 300 mg twice daily.  He will follow-up with his primary care provider to have labs checked to ensure he does not have low sodium.  They have also requested referral to [facility] for neurology and I have placed this order.  Usual return to ER precautions discussed with patient who expresses understanding and is in agreement with plan.    ROS:  General: no fevers, chills  Respiratory: no cough, dyspnea  Cardiovascular: no chest pain, palpitations  Abdomen: No abdominal pain, nausea, vomiting, or diarrhea  Neurologic: No focal weakness    Objective   Physical Exam:  I have reviewed the vital signs.  Temp:  [97.7 °F (36.5 °C)-99.1 °F (37.3 °C)] 97.9 °F (36.6 °C)  Heart Rate:  [77-88] 80  Resp:  [16-18] 18  BP: (135-163)/(83-99) 153/84  General Appearance:    Alert, cooperative, no distress  Head:    Normocephalic, atraumatic  Eyes:    Sclerae anicteric  Neck:   Supple, no mass  Lungs: Clear to auscultation bilaterally, respirations unlabored  Heart: Regular rate and rhythm, S1 and S2 normal, no murmur, rub or gallop  Abdomen:  Soft, non-tender, bowel sounds active, nondistended  Extremities: No clubbing, cyanosis, or edema to lower extremities  Pulses:  2+ and symmetric in distal lower extremities  Skin: No rashes   Neurologic: Oriented x3, Normal strength to extremities    Results Review:    I have reviewed the labs, radiology results and diagnostic studies.    Results from last  7 days   Lab Units 03/07/23  0426   WBC 10*3/mm3 9.00   HEMOGLOBIN g/dL 14.4   HEMATOCRIT % 42.8   PLATELETS 10*3/mm3 222     Results from last 7 days   Lab Units 03/07/23  0426 03/06/23  1936   SODIUM mmol/L 141 141   POTASSIUM mmol/L 4.5 4.0   CHLORIDE mmol/L 108* 106   CO2 mmol/L 24.0 24.1   BUN mg/dL 16 12   CREATININE mg/dL 0.83 0.78   CALCIUM mg/dL 9.1 8.9   BILIRUBIN mg/dL  --  0.6   ALK PHOS U/L  --  82   ALT (SGPT) U/L  --  19   AST (SGOT) U/L  --  13   GLUCOSE mg/dL 201* 103*     Imaging Results (Last 24 Hours)     Procedure Component Value Units Date/Time    CT Head Without Contrast [[id number]] Collected: 03/06/23 1859     Updated: 03/06/23 2328    Narrative:      EMERGENCY NONCONTRAST HEAD CT ON 03/06/2023     CLINICAL HISTORY: Headache     TECHNIQUE: Spiral CT images were obtained from the base of the skull to  the vertex without intravenous contrast. The images were reformatted and  submitted in 3 mm thick axial, sagittal and coronal CT sections with  brain algorithm     COMPARISON: There are no prior head CTs from [facility] for comparison.     FINDINGS: The brain parenchyma is normal in attenuation. The ventricles  are normal in size. I see no focal mass effect. There is no midline  shift. No extra axial fluid collections are identified. There is no  evidence of acute intracranial hemorrhage. The calvarium and skull base  are normal in appearance. There is fluid partially opacifying the  posterior half of the left maxillary sinus. The remainder of the  paranasal sinuses, mastoid air cells and middle ear cavities are clear.       Impression:      1. No acute intracranial abnormality is identified. There is fluid  partially opacifying the posterior half of the left maxillary sinus. The  remainder of the head CT is within normal limits. The results were  communicated to Hubert Robbins by telephone 03/06/2023 at 6:20 PM.      Radiation dose reduction techniques were utilized, including  automated  exposure control and exposure modulation based on body size.     This report was finalized on 3/6/2023 11:25 PM by Dr. Victor Hugo Vee M.D.             I have reviewed the medications.  ---------------------------------------------------------------------------------------------  Assessment & Plan   Assessment/Problem List    Headache      Plan:    Headache  -Neurology consult, Dr. Le  -Neurochecks every 4 hours  -CTA head neck reviewed, negative acute  -Trileptal 300 mg twice daily  -Follow-up with primary care provider  -Follow-up with neurology as needed    Disposition: Home    Follow-up after Discharge: PCP, neurology as needed    39 minutes has been spent by [facility] providers in the care of this patient while under observation status     This note will serve as discharge summary    BILL Jarrett 03/07/23 07:18 EST

## 2023-03-07 NOTE — DISCHARGE INSTRUCTIONS
Follow up with your primary care provider, you will need lab work in 1-2 weeks to ensure your sodium is stable.  Return to the emergency department with worsening symptoms, uncontrolled pain, inability to tolerate oral liquids, fever greater than 101°F not controlled by Tylenol or as needed with emergent concerns.

## 2023-03-07 NOTE — H&P
"[facility]   HISTORY AND PHYSICAL    Patient Name: Elvin Alexis  : 1980  MRN: [id number]  Primary Care Physician:  Mare Romano MD  Date of admission: 3/6/2023    Subjective   Subjective     Chief Complaint: Headache    History of Present Illness  Elvin Alexis is a 42-year-old male, with a past medical history of PVCs,  presented to the emergency department with a complaint of headache x2 days.  He states that it begins the back of his head and has\" electrical shock\" feelings that go down towards his neck.  He denies scalp being tender, nausea, vomiting, numbness, or weakness.  He also denies any visual changes.  States that he went to [facility] yesterday for the same and was given a prescription for Flexeril and ibuprofen.  He states those have not helped his headache.  Review of Systems   Constitutional: Negative.    Eyes: Negative.    Respiratory: Negative.    Cardiovascular: Negative.    Gastrointestinal: Negative.    Endocrine: Negative.    Genitourinary: Negative.    Musculoskeletal: Negative.    Skin: Negative.    Allergic/Immunologic: Negative.    Neurological: Positive for headaches. Negative for dizziness, tremors, seizures, syncope, facial asymmetry, speech difficulty, weakness, light-headedness and numbness.   Hematological: Negative.    Psychiatric/Behavioral: Negative.         Personal History     Past Medical History:   Diagnosis Date   • Dyslipidemia    • Fatty liver    • GERD (gastroesophageal reflux disease)    • Morbidly obese (HCC)    • PVCs (premature ventricular contractions) 2023       Past Surgical History:   Procedure Laterality Date   • INNER EAR SURGERY     • TONSILLECTOMY         Family History: family history includes Arthritis in an other family member; Breast cancer in his mother; COPD in an other family member; Diabetes in his father and another family member; Heart attack in an other family member; Hypertension in his father. Otherwise pertinent FHx " was reviewed and not pertinent to current issue.    Social History:  reports that he has never smoked. He has never used smokeless tobacco. Drug use questions deferred to the physician. He reports that he does not drink alcohol.    Home Medications:  atenolol, cyclobenzaprine, escitalopram, and ibuprofen    Allergies:  Allergies   Allergen Reactions   • Codeine GI Intolerance     Vomit only   • Erythromycin Unknown (See Comments)     CHILDHOOD REACTION   • Phenylephrine Other (See Comments)     Chest pain   • Penicillins Rash       Objective    Objective     Vitals:   Temp:  [97.7 °F (36.5 °C)-99.1 °F (37.3 °C)] 99.1 °F (37.3 °C)  Heart Rate:  [84-88] 84  Resp:  [16] 16  BP: (135-163)/(86-99) 135/86    Physical Exam  Vitals and nursing note reviewed.   Constitutional:       General: He is not in acute distress.     Appearance: Normal appearance.   HENT:      Head: Normocephalic.   Cardiovascular:      Rate and Rhythm: Normal rate and regular rhythm.      Pulses: Normal pulses.      Heart sounds: Normal heart sounds.   Pulmonary:      Effort: Pulmonary effort is normal.      Breath sounds: Normal breath sounds.   Abdominal:      General: Bowel sounds are normal.      Palpations: Abdomen is soft.   Musculoskeletal:         General: Normal range of motion.   Skin:     General: Skin is warm and dry.      Capillary Refill: Capillary refill takes less than 2 seconds.   Neurological:      General: No focal deficit present.      Mental Status: He is alert and oriented to person, place, and time.      GCS: GCS eye subscore is 4. GCS verbal subscore is 5. GCS motor subscore is 6.      Cranial Nerves: Cranial nerves 2-12 are intact.      Sensory: Sensation is intact.      Motor: Motor function is intact.      Coordination: Coordination is intact.      Gait: Gait is intact.   Psychiatric:         Mood and Affect: Mood normal.         Behavior: Behavior normal.         Thought Content: Thought content normal.         Judgment:  Judgment normal.         Result Review    Result Review:  I have personally reviewed the results from the time of this admission to 3/6/2023 21:44 EST and agree with these findings:  [x]  Laboratory list / accordion  [x]  Microbiology  [x]  Radiology  []  EKG/Telemetry   []  Cardiology/Vascular   []  Pathology  [x]  Old records  []  Other:      Assessment & Plan   Assessment / Plan     Brief Patient Summary:  Elvin Alexis is a 42 y.o. male who was admitted to the observation unit for further evaluation and treatment of his headache.    Active Hospital Problems:  Active Hospital Problems    Diagnosis    • **Headache      Plan:   Headache  - Neurology consult  - Neurochecks every 4 hours  - CTA head neck-pending  - Cardiac monitoring  - Vital signs every 4 hours  - Neurology consult    DVT prophylaxis:  Mechanical DVT prophylaxis orders are present.    CODE STATUS:    Code Status (Patient has no pulse and is not breathing): CPR (Attempt to Resuscitate)  Medical Interventions (Patient has pulse or is breathing): Full Support    Admission Status:  I believe this patient meets observation status.    82 minutes has been spent by [facility] providers in the care of this patient while under observation status.    Nakia Malloy, APRN

## 2023-03-09 NOTE — TELEPHONE ENCOUNTER
Caller: Elvin Alexis    Relationship to patient: Self    Best call back number: [phone]    New or established patient?  [x] New  [] Established    Date of discharge: 3/7/2023    Facility discharged from: [facility]    Diagnosis/Symptoms: HEADACHE    Length of stay (If applicable): 16 HOURS    Specialty Only: Did you see a [facility] provider?    [x] Yes  [] No  If so, who? DR BERMUDEZ    DISCHARGE NOTES STATE FOLLOW UP WITH BRENT NELSON.

## 2023-03-13 NOTE — TELEPHONE ENCOUNTER
Provider: YAN ANGEL    Caller: MATILDE    Relationship to Patient: SELF    Phone Number: [phone]    Reason for Call: PT CALLING TO CHECK TO SEE WHEN HE WOULD BE SCHEDULED.  ADVISED IT IS IN THEIR BOX AND SHE IS WORKING ON THE HOSPITAL F/U.    PT V/U    PLEASE CALL & ADVISE

## 2023-03-31 ENCOUNTER — OFFICE VISIT (OUTPATIENT)
Dept: NEUROLOGY | Facility: CLINIC | Age: 43
End: 2023-03-31
Payer: COMMERCIAL

## 2023-03-31 VITALS
HEART RATE: 69 BPM | OXYGEN SATURATION: 97 % | DIASTOLIC BLOOD PRESSURE: 86 MMHG | BODY MASS INDEX: 42.26 KG/M2 | SYSTOLIC BLOOD PRESSURE: 124 MMHG | WEIGHT: 312 LBS | HEIGHT: 72 IN

## 2023-03-31 DIAGNOSIS — M54.81 BILATERAL OCCIPITAL NEURALGIA: Primary | ICD-10-CM

## 2023-03-31 PROCEDURE — 99214 OFFICE O/P EST MOD 30 MIN: CPT | Performed by: PSYCHIATRY & NEUROLOGY

## 2023-03-31 RX ORDER — MELOXICAM 15 MG/1
1 TABLET ORAL DAILY
COMMUNITY
Start: 2023-02-06

## 2023-03-31 RX ORDER — ERGOCALCIFEROL 1.25 MG/1
50000 CAPSULE ORAL WEEKLY
COMMUNITY
Start: 2023-01-13

## 2023-03-31 NOTE — PROGRESS NOTES
Notes by MA:  Patient presents today for a hospital follow up for shooting pains in the back of the head. Patient was seen by Dr. Le while in the hospital and was told it was occipital neuralgia. Patient has been taking trileptal 300 mg BID and sts he has no other issues since.         Subjective:     Patient ID: Elvin Alexis is a 42 y.o. male.    History of Present Illness  The following portions of the patient's history were reviewed and updated as appropriate: allergies, current medications, past family history, past medical history, past social history, past surgical history and problem list.    Review of Systems   Constitutional: Negative for activity change, appetite change and fatigue.   HENT: Negative for facial swelling and trouble swallowing.    Eyes: Negative for photophobia, pain and visual disturbance.   Respiratory: Negative for chest tightness, shortness of breath and wheezing.    Cardiovascular: Negative for chest pain, palpitations and leg swelling.   Gastrointestinal: Negative for abdominal pain, nausea and vomiting.   Musculoskeletal: Negative for arthralgias, back pain, gait problem, joint swelling, myalgias, neck pain and neck stiffness.   Neurological: Negative for dizziness, tremors, seizures, syncope, facial asymmetry, speech difficulty, weakness, light-headedness, numbness and headaches.   Hematological: Does not bruise/bleed easily.   Psychiatric/Behavioral: Positive for sleep disturbance. Negative for agitation, behavioral problems, confusion, decreased concentration, dysphoric mood, hallucinations, self-injury and suicidal ideas. The patient is not nervous/anxious and is not hyperactive.         Objective:    Neurologic Exam  Awake alert pleasant cooperative with fluent speech, normal speech comprehension, and normal social demeanor.    Cranial nerves II through XII normal and symmetric.    Motor exam reveals normal symmetric tone bulk and power.    Sensory exam reveals symmetric  sensation to light touch temperature vibration.  He has normal scalp sensation to vibration bilaterally.  Normal sensation to temperature and light touch in the neck posteriorly.    Tendon reflexes are 2+ and symmetric.  Coordination testing reveals normal finger-nose-finger, normal gait.  Negative Romberg.  Physical Exam    Assessment/Plan:     Diagnoses and all orders for this visit:    1. Bilateral occipital neuralgia (Primary)     Occipital neuralgia, responding nicely to Trileptal at 300 mg twice daily.  Work-up at the hospital was thorough.  I reviewed that with him in detail.  Exam remains normal.  Continuing Trileptal for 3 months.  We will see him back at that time and if doing well, we will consider a slow taper.  No other changes at this time.  Discussed the diagnosis, underlying pathophysiology, natural history with him.

## 2023-05-02 ENCOUNTER — DOCUMENTATION (OUTPATIENT)
Dept: CARDIOLOGY | Facility: CLINIC | Age: 43
End: 2023-05-02
Payer: COMMERCIAL

## 2023-05-02 NOTE — PROGRESS NOTES
I was following up on the referral I placed for [facility] sleep medicine.  Patient canceled appointment yesterday despite our recommendations.